# Patient Record
Sex: MALE | Race: WHITE | Employment: OTHER | URBAN - METROPOLITAN AREA
[De-identification: names, ages, dates, MRNs, and addresses within clinical notes are randomized per-mention and may not be internally consistent; named-entity substitution may affect disease eponyms.]

---

## 2020-01-29 ENCOUNTER — OFFICE VISIT (OUTPATIENT)
Dept: FAMILY MEDICINE | Facility: CLINIC | Age: 40
End: 2020-01-29
Payer: COMMERCIAL

## 2020-01-29 VITALS
HEIGHT: 69 IN | BODY MASS INDEX: 28.58 KG/M2 | HEART RATE: 80 BPM | SYSTOLIC BLOOD PRESSURE: 141 MMHG | WEIGHT: 193 LBS | DIASTOLIC BLOOD PRESSURE: 84 MMHG | TEMPERATURE: 98.1 F | OXYGEN SATURATION: 97 %

## 2020-01-29 DIAGNOSIS — B07.9 VIRAL WARTS, UNSPECIFIED TYPE: ICD-10-CM

## 2020-01-29 DIAGNOSIS — N52.9 ERECTILE DYSFUNCTION, UNSPECIFIED ERECTILE DYSFUNCTION TYPE: ICD-10-CM

## 2020-01-29 DIAGNOSIS — Z76.89 ENCOUNTER TO ESTABLISH CARE WITH NEW DOCTOR: Primary | ICD-10-CM

## 2020-01-29 DIAGNOSIS — I10 BENIGN ESSENTIAL HYPERTENSION: ICD-10-CM

## 2020-01-29 PROCEDURE — 99204 OFFICE O/P NEW MOD 45 MIN: CPT | Performed by: INTERNAL MEDICINE

## 2020-01-29 RX ORDER — TADALAFIL 5 MG/1
5 TABLET ORAL DAILY PRN
Qty: 30 TABLET | Refills: 11 | Status: SHIPPED | OUTPATIENT
Start: 2020-01-29 | End: 2021-04-22

## 2020-01-29 RX ORDER — TADALAFIL 5 MG/1
5 TABLET ORAL DAILY PRN
Qty: 30 TABLET | Refills: 11 | Status: SHIPPED | OUTPATIENT
Start: 2020-01-29 | End: 2020-01-29

## 2020-01-29 ASSESSMENT — MIFFLIN-ST. JEOR: SCORE: 1766.05

## 2020-01-29 NOTE — PROGRESS NOTES
Chief Complaint:     Raymundo Mcwilliams is a 39 year old male who presents to clinic today for the following health issues:    Establish care    Chief Complaint   Patient presents with     Erectile Dysfunction       HPI:   Patient Raymundo Mcwilliams is a very pleasant 39 year old male with history of wart of the right wrist, chronic ED symptoms who presents to Internal Medicine clinic today to establish care and for evaluation of multiple concerns including poorly controlled chronic ED symptoms. Regarding the patient's chronic ED, the patient complains of poorly controlled ED symptoms for the past a couple of years after he turned 38. he is not on any Ed medication at this time and is interested in starting an ED medication such as Cialis for ED treatment going forward. Regarding the patient's chronic right wrist wart symptoms, the patient is interested in an evaluation by the Skin care clinic at [facility] going forward. No chest pain, headaches, fever or chills.         Current Medications:     Current Outpatient Medications   Medication Sig Dispense Refill     order for DME Equipment being ordered: Digital home blood pressure monitor kit 1 each 3     tadalafil (CIALIS) 5 MG tablet Take 1 tablet (5 mg) by mouth daily as needed (ED) 30 tablet 11         Allergies:      Allergies   Allergen Reactions     No Known Allergies             Past Medical History:     Past Medical History:   Diagnosis Date     ED (erectile dysfunction)      HTN (hypertension)          Past Surgical History:   No past surgical history on file.      Family Medical History:     Family History   Problem Relation Age of Onset     Diverticulitis Mother      Diverticulitis Father          Social History:     Social History     Socioeconomic History     Marital status:      Spouse name: Not on file     Number of children: Not on file     Years of education: Not on file     Highest education level: Not on file   Occupational  History     Not on file   Social Needs     Financial resource strain: Not on file     Food insecurity:     Worry: Not on file     Inability: Not on file     Transportation needs:     Medical: Not on file     Non-medical: Not on file   Tobacco Use     Smoking status: Never Smoker     Smokeless tobacco: Never Used   Substance and Sexual Activity     Alcohol use: Yes     Comment: 10 drinks a week     Drug use: Not Currently     Sexual activity: Yes     Partners: Female   Lifestyle     Physical activity:     Days per week: Not on file     Minutes per session: Not on file     Stress: Not on file   Relationships     Social connections:     Talks on phone: Not on file     Gets together: Not on file     Attends Congregation service: Not on file     Active member of club or organization: Not on file     Attends meetings of clubs or organizations: Not on file     Relationship status: Not on file     Intimate partner violence:     Fear of current or ex partner: Not on file     Emotionally abused: Not on file     Physically abused: Not on file     Forced sexual activity: Not on file   Other Topics Concern     Not on file   Social History Narrative     Not on file           Review of System:     Constitutional: Negative for fever or chills  Skin: positive for wart symptoms on the dorsal right wrist  Ears/Nose/Throat: Negative for nasal congestion, sore throat  Respiratory: No shortness of breath, dyspnea on exertion, cough, or hemoptysis  Cardiovascular: Negative for chest pain  Gastrointestinal: Negative for nausea, vomiting  Genitourinary: Negative for dysuria, hematuria, positive for chronic ED  Musculoskeletal: Negative for myalgias  Neurologic: Negative for headaches  Psychiatric: Negative for depression, anxiety  Hematologic/Lymphatic/Immunologic: Negative  Endocrine: Negative  Behavioral: Negative for tobacco use       Physical Exam:   BP (!) 141/84 (BP Location: Left arm, Patient Position: Sitting, Cuff Size: Adult Large)   " Pulse 80   Temp 98.1  F (36.7  C) (Oral)   Ht 1.745 m (5' 8.7\")   Wt 87.5 kg (193 lb)   SpO2 97%   BMI 28.75 kg/m      GENERAL: alert and no distress  EYES: eyes grossly normal to inspection, and conjunctivae and sclerae normal  HENT: Normocephalic atraumatic. Nose and mouth without ulcers or lesions  NECK: supple  RESP: lungs clear to auscultation   CV: regular rate and rhythm, normal S1 S2  LYMPH: no peripheral edema   ABDOMEN: nondistended  MS: no gross musculoskeletal defects noted  SKIN:wart symptoms noted on the dorsal right wrist  NEURO: Alert & Oriented x 3.   PSYCH: mentation appears normal, affect normal        Diagnostic Test Results:     Diagnostic Test Results:  No results found for any visits on 01/29/20.    ASSESSMENT/PLAN:       (Z76.89) Encounter to establish care with new doctor  (primary encounter diagnosis)  (N52.9) Erectile dysfunction, unspecified erectile dysfunction type  Comment: poorly controlled chronic ED symptoms  Plan: tadalafil (CIALIS) 5 MG tablet    (B07.9) Viral warts, unspecified type  Comment: chronic wart symptoms of the right wrist  Plan: SKIN CARE REFERRAL      (I10) Benign essential hypertension  Comment: BP is mildly elevated. Patient wants to try diet and exercise weight loss treatment first before considering pharmaceutical medication therapy at this time  Plan: order for DME for outpatient BP monitor, patient is advised to start a new diet and exercise program going forward this year.      Follow Up Plan:     Patient is instructed to return to Internal Medicine clinic for follow-up visit in 1 month.        Sybil Cunningham MD  Internal Medicine  [facility]    "

## 2020-11-04 ENCOUNTER — OFFICE VISIT (OUTPATIENT)
Dept: DERMATOLOGY | Facility: CLINIC | Age: 40
End: 2020-11-04
Payer: COMMERCIAL

## 2020-11-04 DIAGNOSIS — L28.2 PRURIGO: Primary | ICD-10-CM

## 2020-11-04 PROCEDURE — 17110 DESTRUCTION B9 LES UP TO 14: CPT | Performed by: DERMATOLOGY

## 2020-11-04 PROCEDURE — 99202 OFFICE O/P NEW SF 15 MIN: CPT | Mod: 25 | Performed by: DERMATOLOGY

## 2020-11-04 RX ORDER — FLUOCINONIDE 0.5 MG/G
OINTMENT TOPICAL 2 TIMES DAILY
Qty: 30 G | Refills: 0 | Status: SHIPPED | OUTPATIENT
Start: 2020-11-04 | End: 2020-12-21

## 2020-11-04 ASSESSMENT — PAIN SCALES - GENERAL: PAINLEVEL: NO PAIN (0)

## 2020-11-04 NOTE — NURSING NOTE
Dermatology Rooming Note    Raymundo Mcwilliams's goals for this visit include:   Chief Complaint   Patient presents with     Derm Problem     Raymundo is here today for a skin lesion on his left wrist      Kevyn Church, MERRILL

## 2020-11-04 NOTE — PATIENT INSTRUCTIONS
"For spot on wrist, I think this is a little \"prurigo\" spot or an itch/scratch spot.  We will freeze today.  Can then start topical steroid Lidex ointment 1-2 times daily and try to keep covered.    Return in 6-8 weeks, sooner if concerns. Cancel if doing well.    Cryotherapy    What is it?    Use of a very cold liquid, such as liquid nitrogen, to freeze and destroy abnormal skin cells that need to be removed    What should I expect?    Tenderness and redness    A small blister that might grow and fill with dark purple blood. There may be crusting.    More than one treatment may be needed if the lesions do not go away.    How do I care for the treated area?    Gently wash the area with your hands when bathing.    Use a thin layer of Vaseline to help with healing. You may use a Band-Aid.     The area should heal within 7-10 days and may leave behind a pink or lighter color.     Do not use an antibiotic or Neosporin ointment.     You may take acetaminophen (Tylenol) for pain.     Call your Doctor if you have:    Severe pain    Signs of infection (warmth, redness, cloudy yellow drainage, and or a bad smell)    Questions or concerns    Who should I call with questions?       [facility]: [phone]       [facility]: [phone]       For urgent needs outside of business hours call the [facility] at [phone]        and ask for the dermatology resident on call    "

## 2020-11-04 NOTE — LETTER
11/4/2020       RE: Raymundo Mcwilliams  [address]  [place] [address]     Dear Colleague,    Thank you for referring your patient, Raymundo Mcwilliams, to the [facility] DERMATOLOGY CLINIC [place] at [facility]. Please see a copy of my visit note below.    [facility] Dermatology Note      Dermatology Problem List:  1. Prurigo, L wrist.  - Cryo 11/4/2020, Lidex ointment    CC:   Chief Complaint   Patient presents with     Derm Problem     Raymundo is here today for a skin lesion on his left wrist          Encounter Date: Nov 4, 2020    History of Present Illness:  Mr. Raymundo Mcwillimas is a 40 year old male who presents for evaluation of lesion on left wrist. Present for >1 year. He will frequently pick at it. Tried wart remover and had it frozen once previously but no improvement. It will wax/wane in size but generally not increasing in size. Does not bleed or hurt. No other painful, bleeding, non-healing, or otherwise symptomatic lesions. No personal history of skin cancer. An uncle had skin cancer but does not think it was melanoma. Otherwise feeling well, no additional skin concerns.     Past Medical History:   There is no problem list on file for this patient.    Past Medical History:   Diagnosis Date     ED (erectile dysfunction)      HTN (hypertension)      History reviewed. No pertinent surgical history.    Social History:  Patient reports that he has never smoked. He has never used smokeless tobacco. He reports current alcohol use. He reports previous drug use.    Family History:  Family History   Problem Relation Age of Onset     Diverticulitis Mother      Diverticulitis Father      Skin Cancer Father      Skin Cancer Paternal Uncle      Melanoma No family hx of        Medications:  Current Outpatient Medications   Medication Sig Dispense Refill     fluocinonide (LIDEX) 0.05 % external ointment Apply topically 2 times daily To spot on  wrist as needed, keep covered with duoderm or bandaid 30 g 0     order for DME Equipment being ordered: Digital home blood pressure monitor kit (Patient not taking: Reported on 11/4/2020) 1 each 3     tadalafil (CIALIS) 5 MG tablet Take 1 tablet (5 mg) by mouth daily as needed (ED) (Patient not taking: Reported on 11/4/2020) 30 tablet 11     Allergies   Allergen Reactions     No Known Allergies          Review of Systems:  -Constitutional: Otherwise feeling well today, in usual state of health.  -Skin: As above in HPI. No additional skin concerns.    Physical exam:  GEN: This is a well developed, well-nourished male in no acute distress, in a pleasant mood.    SKIN: Focused examination of the left wrist was performed.  -William skin type: II  -~4 mm tan to slightly pink dome-shaped papule on left wrist. Dermoscopy no abnormal vessels or shiny white structures or other concerning features.  -No other lesions of concern on areas examined.     Labs:  n/a    Impression/Plan:  1. Prurigo nodule, left wrist. Natural history and etiology discussed. Advised empiric management as below. Plan for follow-up in 6-8 weeks to ensure improvement and/or repeat cryo if needed. He will call sooner if issues. Okay to cancel if doing well.  - Cryotherapy procedure note: After verbal consent and discussion of risks and benefits including but no limited to dyspigmentation/scar, blister, and pain, 1 was(were) treated with 1-2mm freeze border for 2 cycles with liquid nitrogen. Post cryotherapy instructions were provided.  - Start Lidex ointment BID prn, keep covered at all times with either duoderm or band-aid.    Follow-up in 6-8 weeks, earlier for new or changing lesions.      Staff Involved:  Staff Only    Lisa Jimenez MD    Department of Dermatology  [facility] [place]: Phone: [phone], Fax: [phone]  11/8/2020

## 2020-11-08 NOTE — PROGRESS NOTES
[facility] Dermatology Note      Dermatology Problem List:  1. Prurigo, L wrist.  - Cryo 11/4/2020, Lidex ointment    CC:   Chief Complaint   Patient presents with     Derm Problem     Raymundo is here today for a skin lesion on his left wrist          Encounter Date: Nov 4, 2020    History of Present Illness:  Mr. Raymundo Mcwilliams is a 40 year old male who presents for evaluation of lesion on left wrist. Present for >1 year. He will frequently pick at it. Tried wart remover and had it frozen once previously but no improvement. It will wax/wane in size but generally not increasing in size. Does not bleed or hurt. No other painful, bleeding, non-healing, or otherwise symptomatic lesions. No personal history of skin cancer. An uncle had skin cancer but does not think it was melanoma. Otherwise feeling well, no additional skin concerns.     Past Medical History:   There is no problem list on file for this patient.    Past Medical History:   Diagnosis Date     ED (erectile dysfunction)      HTN (hypertension)      History reviewed. No pertinent surgical history.    Social History:  Patient reports that he has never smoked. He has never used smokeless tobacco. He reports current alcohol use. He reports previous drug use.    Family History:  Family History   Problem Relation Age of Onset     Diverticulitis Mother      Diverticulitis Father      Skin Cancer Father      Skin Cancer Paternal Uncle      Melanoma No family hx of        Medications:  Current Outpatient Medications   Medication Sig Dispense Refill     fluocinonide (LIDEX) 0.05 % external ointment Apply topically 2 times daily To spot on wrist as needed, keep covered with duoderm or bandaid 30 g 0     order for DME Equipment being ordered: Digital home blood pressure monitor kit (Patient not taking: Reported on 11/4/2020) 1 each 3     tadalafil (CIALIS) 5 MG tablet Take 1 tablet (5 mg) by mouth daily as needed (ED) (Patient not taking: Reported on  11/4/2020) 30 tablet 11     Allergies   Allergen Reactions     No Known Allergies          Review of Systems:  -Constitutional: Otherwise feeling well today, in usual state of health.  -Skin: As above in HPI. No additional skin concerns.    Physical exam:  GEN: This is a well developed, well-nourished male in no acute distress, in a pleasant mood.    SKIN: Focused examination of the left wrist was performed.  -William skin type: II  -~4 mm tan to slightly pink dome-shaped papule on left wrist. Dermoscopy no abnormal vessels or shiny white structures or other concerning features.  -No other lesions of concern on areas examined.     Labs:  n/a    Impression/Plan:  1. Prurigo nodule, left wrist. Natural history and etiology discussed. Advised empiric management as below. Plan for follow-up in 6-8 weeks to ensure improvement and/or repeat cryo if needed. He will call sooner if issues. Okay to cancel if doing well.  - Cryotherapy procedure note: After verbal consent and discussion of risks and benefits including but no limited to dyspigmentation/scar, blister, and pain, 1 was(were) treated with 1-2mm freeze border for 2 cycles with liquid nitrogen. Post cryotherapy instructions were provided.  - Start Lidex ointment BID prn, keep covered at all times with either duoderm or band-aid.    Follow-up in 6-8 weeks, earlier for new or changing lesions.      Staff Involved:  Staff Only    Lisa Jimenez MD    Department of Dermatology  [facility]: Phone: [phone], Fax: [phone]  11/8/2020

## 2020-11-14 DIAGNOSIS — L28.2 PRURIGO: ICD-10-CM

## 2020-11-14 RX ORDER — FLUOCINONIDE 0.5 MG/G
OINTMENT TOPICAL 2 TIMES DAILY
Qty: 30 G | Refills: 0 | Status: CANCELLED | OUTPATIENT
Start: 2020-11-14

## 2020-11-16 NOTE — TELEPHONE ENCOUNTER
" Centralized Medication Refill Team note:  fluocinonide (LIDEX) 0.05 % external ointment, fax requesting day supply/duration of use per insurance requirement.     Per Dr Jimenez note: \"Prurigo nodule, left wrist. Natural history and etiology discussed. Advised empiric management as below. Plan for follow-up in 6-8 weeks to ensure improvement and/or repeat cryo if needed. He will call sooner if issues. Okay to cancel if doing well....    AVS : For spot on wrist, I think this is a little \"prurigo\" spot or an itch/scratch spot.  We will freeze today.  Can then start topical steroid Lidex ointment 1-2 times daily and try to keep covered.     Return in 6-8 weeks, sooner if concerns. Cancel if doing well.\"  Contacted pharmacy and reviewed 60 day supply.  "

## 2020-12-21 ENCOUNTER — OFFICE VISIT (OUTPATIENT)
Dept: DERMATOLOGY | Facility: CLINIC | Age: 40
End: 2020-12-21
Payer: COMMERCIAL

## 2020-12-21 DIAGNOSIS — L28.2 PRURIGO: ICD-10-CM

## 2020-12-21 PROCEDURE — 17110 DESTRUCTION B9 LES UP TO 14: CPT | Performed by: PHYSICIAN ASSISTANT

## 2020-12-21 PROCEDURE — 99212 OFFICE O/P EST SF 10 MIN: CPT | Mod: 25 | Performed by: PHYSICIAN ASSISTANT

## 2020-12-21 RX ORDER — FLUOCINONIDE 0.5 MG/G
OINTMENT TOPICAL 2 TIMES DAILY
Qty: 30 G | Refills: 0 | Status: SHIPPED | OUTPATIENT
Start: 2020-12-21 | End: 2023-11-02

## 2020-12-21 ASSESSMENT — PAIN SCALES - GENERAL: PAINLEVEL: NO PAIN (0)

## 2020-12-21 NOTE — PROGRESS NOTES
[facility] Dermatology Note    Dermatology Problem List:  1. Prurigo, L wrist.  - Cryo 11/4/2020, 12/21/20, Lidex ointment    CC:   Chief Complaint   Patient presents with     Derm Problem     Prurigo nodule, left wrist - never started the Lidex.     Encounter Date: Dec 21, 2020    History of Present Illness:  Mr. Raymundo Mcwilliams is a 40 year old male who returns regarding a prurigo nodule on the L wrist. He had cryo to the site last visit and did not stat the topical prescribed. Today he reports    Past Medical History:   There is no problem list on file for this patient.    Past Medical History:   Diagnosis Date     ED (erectile dysfunction)      HTN (hypertension)      No past surgical history on file.    Social History:  Patient reports that he has never smoked. He has never used smokeless tobacco. He reports current alcohol use. He reports previous drug use. Two kids, lives in [place].    Family History:  No fhx melanoma  NMSC in parental uncle    Medications:  Current Outpatient Medications   Medication Sig Dispense Refill     fluocinonide (LIDEX) 0.05 % external ointment Apply topically 2 times daily To spot on wrist as needed, keep covered with duoderm or bandaid (Patient not taking: Reported on 12/21/2020) 30 g 0     order for DME Equipment being ordered: Digital home blood pressure monitor kit (Patient not taking: Reported on 11/4/2020) 1 each 3     tadalafil (CIALIS) 5 MG tablet Take 1 tablet (5 mg) by mouth daily as needed (ED) (Patient not taking: Reported on 11/4/2020) 30 tablet 11     Allergies   Allergen Reactions     No Known Allergies      Review of Systems:  -Constitutional: The patient denies fatigue, fevers, chills, unintended weight loss, and night sweats.  -HEENT: Patient denies nonhealing oral sores.  -Skin: As above in HPI. No additional skin concerns.    Physical exam:  Vitals: There were no vitals taken for this visit.  GEN: This is a well developed, well-nourished  male in no acute distress, in a pleasant mood.    SKIN: Focused examination of the L wrist and face was performed.  -William skin type: II  -4 mm tan to slightly pink dome-shaped papule on left wrist. Dermoscopy no abnormal vessels or shiny white structures or other concerning features.  -No other lesions of concern on areas examined.     Impression/Plan:  1. Prurigo nodule, left wrist. Natural history and etiology discussed. Advised empiric management as below.  - Cryotherapy procedure note: After verbal consent and discussion of risks and benefits including but no limited to dyspigmentation/scar, blister, and pain, 1 was(were) treated with 1-2mm freeze border for 2 cycles with liquid nitrogen. Post cryotherapy instructions were provided.  - Start Lidex ointment BID prn, keep covered at all times with either duoderm or band-aid.  - Avoid picking/scratching area     Follow-up prn, earlier for new or changing lesions.     CC Dr. Rodriguez on close of this encounter.  Follow-up prn for new or changing lesions.       Staff Involved:  Staff Only  All risks, benefits and alternatives were discussed with patient.  Patient is in agreement and understands the assessment and plan.  All questions were answered.    Sari Tse PA-C, MPAS  [facility] [place]: Phone: [phone], Fax: [phone]  [facility]: Phone: [phone],  Fax: [phone]

## 2020-12-21 NOTE — NURSING NOTE
Dermatology Rooming Note    Raymundo Mcwilliams's goals for this visit include:   Chief Complaint   Patient presents with     Derm Problem     Prurigo nodule, left wrist - never started the Lidex.     Patricia Anand, CMA

## 2020-12-21 NOTE — LETTER
12/21/2020       RE: Raymundo Mcwilliams  [address]  [place] [address]     Dear Colleague,    Thank you for referring your patient, Raymundo Mcwilliams, to the [facility] DERMATOLOGY CLINIC [place] at [facility]. Please see a copy of my visit note below.    [facility] Dermatology Note    Dermatology Problem List:  1. Prurigo, L wrist.  - Cryo 11/4/2020, 12/21/20, Lidex ointment    CC:   Chief Complaint   Patient presents with     Derm Problem     Prurigo nodule, left wrist - never started the Lidex.     Encounter Date: Dec 21, 2020    History of Present Illness:  Mr. Raymundo Mcwilliams is a 40 year old male who returns regarding a prurigo nodule on the L wrist. He had cryo to the site last visit and did not stat the topical prescribed. Today he reports    Past Medical History:   There is no problem list on file for this patient.    Past Medical History:   Diagnosis Date     ED (erectile dysfunction)      HTN (hypertension)      No past surgical history on file.    Social History:  Patient reports that he has never smoked. He has never used smokeless tobacco. He reports current alcohol use. He reports previous drug use. Two kids, lives in [place].    Family History:  No fhx melanoma  NMSC in parental uncle    Medications:  Current Outpatient Medications   Medication Sig Dispense Refill     fluocinonide (LIDEX) 0.05 % external ointment Apply topically 2 times daily To spot on wrist as needed, keep covered with duoderm or bandaid (Patient not taking: Reported on 12/21/2020) 30 g 0     order for DME Equipment being ordered: Digital home blood pressure monitor kit (Patient not taking: Reported on 11/4/2020) 1 each 3     tadalafil (CIALIS) 5 MG tablet Take 1 tablet (5 mg) by mouth daily as needed (ED) (Patient not taking: Reported on 11/4/2020) 30 tablet 11     Allergies   Allergen Reactions     No Known Allergies      Review of  Systems:  -Constitutional: The patient denies fatigue, fevers, chills, unintended weight loss, and night sweats.  -HEENT: Patient denies nonhealing oral sores.  -Skin: As above in HPI. No additional skin concerns.    Physical exam:  Vitals: There were no vitals taken for this visit.  GEN: This is a well developed, well-nourished male in no acute distress, in a pleasant mood.    SKIN: Focused examination of the L wrist and face was performed.  -William skin type: II  -4 mm tan to slightly pink dome-shaped papule on left wrist. Dermoscopy no abnormal vessels or shiny white structures or other concerning features.  -No other lesions of concern on areas examined.     Impression/Plan:  1. Prurigo nodule, left wrist. Natural history and etiology discussed. Advised empiric management as below.  - Cryotherapy procedure note: After verbal consent and discussion of risks and benefits including but no limited to dyspigmentation/scar, blister, and pain, 1 was(were) treated with 1-2mm freeze border for 2 cycles with liquid nitrogen. Post cryotherapy instructions were provided.  - Start Lidex ointment BID prn, keep covered at all times with either duoderm or band-aid.  - Avoid picking/scratching area     Follow-up prn, earlier for new or changing lesions.     CC Dr. Rodriguez on close of this encounter.  Follow-up prn for new or changing lesions.       Staff Involved:  Staff Only  All risks, benefits and alternatives were discussed with patient.  Patient is in agreement and understands the assessment and plan.  All questions were answered.    Sari Tse PA-C, MPAS  [facility] [place]: Phone: [phone], Fax: [phone]  [facility]: Phone: [phone],  Fax: [phone]

## 2021-03-07 ENCOUNTER — HEALTH MAINTENANCE LETTER (OUTPATIENT)
Age: 41
End: 2021-03-07

## 2021-04-22 DIAGNOSIS — N52.9 ERECTILE DYSFUNCTION, UNSPECIFIED ERECTILE DYSFUNCTION TYPE: ICD-10-CM

## 2021-04-22 RX ORDER — TADALAFIL 5 MG/1
TABLET ORAL
Qty: 30 TABLET | Refills: 0 | Status: SHIPPED | OUTPATIENT
Start: 2021-04-22 | End: 2021-04-28

## 2021-04-28 ENCOUNTER — ANCILLARY PROCEDURE (OUTPATIENT)
Dept: GENERAL RADIOLOGY | Facility: CLINIC | Age: 41
End: 2021-04-28
Attending: INTERNAL MEDICINE
Payer: COMMERCIAL

## 2021-04-28 ENCOUNTER — OFFICE VISIT (OUTPATIENT)
Dept: FAMILY MEDICINE | Facility: CLINIC | Age: 41
End: 2021-04-28
Payer: COMMERCIAL

## 2021-04-28 VITALS
OXYGEN SATURATION: 99 % | SYSTOLIC BLOOD PRESSURE: 125 MMHG | HEIGHT: 69 IN | DIASTOLIC BLOOD PRESSURE: 83 MMHG | HEART RATE: 71 BPM | WEIGHT: 196 LBS | TEMPERATURE: 96.9 F | BODY MASS INDEX: 29.03 KG/M2

## 2021-04-28 DIAGNOSIS — G89.29 CHRONIC PAIN OF LEFT KNEE: ICD-10-CM

## 2021-04-28 DIAGNOSIS — N52.9 ERECTILE DYSFUNCTION, UNSPECIFIED ERECTILE DYSFUNCTION TYPE: ICD-10-CM

## 2021-04-28 DIAGNOSIS — M25.562 CHRONIC PAIN OF LEFT KNEE: Primary | ICD-10-CM

## 2021-04-28 DIAGNOSIS — M67.432 GANGLION CYST OF WRIST, LEFT: ICD-10-CM

## 2021-04-28 DIAGNOSIS — M25.562 CHRONIC PAIN OF LEFT KNEE: ICD-10-CM

## 2021-04-28 DIAGNOSIS — G89.29 CHRONIC PAIN OF LEFT KNEE: Primary | ICD-10-CM

## 2021-04-28 PROCEDURE — 73110 X-RAY EXAM OF WRIST: CPT | Mod: LT | Performed by: RADIOLOGY

## 2021-04-28 PROCEDURE — 99214 OFFICE O/P EST MOD 30 MIN: CPT | Performed by: INTERNAL MEDICINE

## 2021-04-28 PROCEDURE — 73560 X-RAY EXAM OF KNEE 1 OR 2: CPT | Mod: 26 | Performed by: RADIOLOGY

## 2021-04-28 RX ORDER — TADALAFIL 5 MG/1
TABLET ORAL
Qty: 30 TABLET | Refills: 11 | Status: SHIPPED | OUTPATIENT
Start: 2021-04-28 | End: 2022-05-19

## 2021-04-28 ASSESSMENT — MIFFLIN-ST. JEOR: SCORE: 1769.66

## 2021-04-28 NOTE — PROGRESS NOTES
Kamron Fonseca is a 41 year old who presents for the following health issues     HPI     Chief Complaint:     Knee pains    HPI:   Knee Pain    Duration:     Since: chronic           Specific cause: sports injuries    Description:      Location of pain: left knee     Character of pain: dull     Pain radiation: none    Intensity: moderate    History:      Pain interferes with job: yes     History of similar pain problems: yes     Any previous MRI or X-rays: none     Therapies tried without relief: none    Alleviating factors:      Improved by: rest    Precipitating factors:    Worsened by: running    Accompanying Signs & Symptoms: none            Current Medications:     Current Outpatient Medications   Medication Sig Dispense Refill     fluocinonide (LIDEX) 0.05 % external ointment Apply topically 2 times daily To spot on wrist as needed, keep covered with duoderm or bandaid 30 g 0     order for DME Equipment being ordered: Digital home blood pressure monitor kit 1 each 3     tadalafil (CIALIS) 5 MG tablet TAKE ONE TABLET BY MOUTH DAILY AS NEEDED 30 tablet 11         Allergies:      Allergies   Allergen Reactions     No Known Allergies             Past Medical History:     Past Medical History:   Diagnosis Date     ED (erectile dysfunction)      HTN (hypertension)          Past Surgical History:   No past surgical history on file.      Family Medical History:     Family History   Problem Relation Age of Onset     Diverticulitis Mother      Diverticulitis Father      Skin Cancer Father      Skin Cancer Paternal Uncle      Melanoma No family hx of          Social History:     Social History     Socioeconomic History     Marital status:      Spouse name: Not on file     Number of children: Not on file     Years of education: Not on file     Highest education level: Not on file   Occupational History     Not on file   Social Needs     Financial resource strain: Not on file     Food insecurity     Worry: Not  on file     Inability: Not on file     Transportation needs     Medical: Not on file     Non-medical: Not on file   Tobacco Use     Smoking status: Never Smoker     Smokeless tobacco: Never Used   Substance and Sexual Activity     Alcohol use: Yes     Comment: 10 drinks a week     Drug use: Not Currently     Sexual activity: Yes     Partners: Female   Lifestyle     Physical activity     Days per week: Not on file     Minutes per session: Not on file     Stress: Not on file   Relationships     Social connections     Talks on phone: Not on file     Gets together: Not on file     Attends Congregational service: Not on file     Active member of club or organization: Not on file     Attends meetings of clubs or organizations: Not on file     Relationship status: Not on file     Intimate partner violence     Fear of current or ex partner: Not on file     Emotionally abused: Not on file     Physically abused: Not on file     Forced sexual activity: Not on file   Other Topics Concern     Parent/sibling w/ CABG, MI or angioplasty before 65F 55M? Not Asked   Social History Narrative     Not on file           Review of System:     Constitutional: Negative for fever or chills  Skin: Negative for rashes  Ears/Nose/Throat: Negative for nasal congestion, sore throat  Respiratory: No shortness of breath, dyspnea on exertion, cough, or hemoptysis  Cardiovascular: Negative for chest pain  Gastrointestinal: Negative for nausea, vomiting  Genitourinary: Negative for dysuria, hematuria, positive for chronic ED  Musculoskeletal: positive for left knee pains noted, left wrist ganglion cyst symptoms  Neurologic: Negative for headaches  Psychiatric: Negative for depression, anxiety  Hematologic/Lymphatic/Immunologic: Negative  Endocrine: Negative  Behavioral: Negative for tobacco use       Physical Exam:   BP (!) 137/94 (BP Location: Right arm, Patient Position: Sitting, Cuff Size: Adult Regular)   Pulse 82   Temp 96.9  F (36.1  C) (Temporal)   " Ht 1.745 m (5' 8.7\")   Wt 88.9 kg (196 lb)   SpO2 99%   BMI 29.20 kg/m      GENERAL: alert and no distress  EYES: eyes grossly normal to inspection, and conjunctivae and sclerae normal  HENT: Normocephalic atraumatic. Nose and mouth without ulcers or lesions  NECK: supple  RESP: lungs clear to auscultation   CV: regular rate and rhythm, normal S1 S2  LYMPH: no peripheral edema   ABDOMEN: nondistended  MS: left knee pains noted, left wrist ganglion cyst symptoms noted  SKIN: no suspicious lesions or rashes  NEURO: Alert & Oriented x 3.   PSYCH: mentation appears normal, affect normal        Diagnostic Test Results:     Diagnostic Test Results:  Labs reviewed in Epic    ASSESSMENT/PLAN:       (M25.562,  G89.29) Chronic pain of left knee  (primary encounter diagnosis)  Comment: chronic left knee pains  Plan:  Orthopedic & Spine          Referral, XR Knee Standing Left 2         Views      (N52.9) Erectile dysfunction, unspecified erectile dysfunction type  Comment: stable, patient is due for a refill of Cialis medication.  Plan: tadalafil (CIALIS) 5 MG tablet      (M67.432) Ganglion cyst of wrist, left  Comment: likely left wrist ganglion cyst symptoms  Plan: XR Wrist Left G/E 3 Views,  Orthopedic & Spine          Referral        Follow Up Plan:     Patient is instructed to return to Internal Medicine clinic for follow-up visit in 1 month.        Sybil Cunningham MD  Internal Medicine  [facility]    "

## 2021-10-10 ENCOUNTER — HEALTH MAINTENANCE LETTER (OUTPATIENT)
Age: 41
End: 2021-10-10

## 2021-12-09 ENCOUNTER — OFFICE VISIT (OUTPATIENT)
Dept: FAMILY MEDICINE | Facility: CLINIC | Age: 41
End: 2021-12-09
Payer: COMMERCIAL

## 2021-12-09 VITALS
TEMPERATURE: 97.7 F | RESPIRATION RATE: 16 BRPM | WEIGHT: 194 LBS | HEART RATE: 82 BPM | BODY MASS INDEX: 28.73 KG/M2 | SYSTOLIC BLOOD PRESSURE: 143 MMHG | OXYGEN SATURATION: 97 % | DIASTOLIC BLOOD PRESSURE: 95 MMHG | HEIGHT: 69 IN

## 2021-12-09 DIAGNOSIS — Z13.29 SCREENING FOR THYROID DISORDER: ICD-10-CM

## 2021-12-09 DIAGNOSIS — Z12.5 SCREENING FOR PROSTATE CANCER: ICD-10-CM

## 2021-12-09 DIAGNOSIS — Z00.00 ROUTINE HISTORY AND PHYSICAL EXAMINATION OF ADULT: ICD-10-CM

## 2021-12-09 DIAGNOSIS — Z23 HIGH PRIORITY FOR 2019-NCOV VACCINE: Primary | ICD-10-CM

## 2021-12-09 DIAGNOSIS — Z13.220 LIPID SCREENING: ICD-10-CM

## 2021-12-09 DIAGNOSIS — Z13.1 SCREENING FOR DIABETES MELLITUS: ICD-10-CM

## 2021-12-09 LAB
ANION GAP SERPL CALCULATED.3IONS-SCNC: 4 MMOL/L (ref 3–14)
BASOPHILS # BLD AUTO: 0.1 10E3/UL (ref 0–0.2)
BASOPHILS NFR BLD AUTO: 1 %
BUN SERPL-MCNC: 18 MG/DL (ref 7–30)
CALCIUM SERPL-MCNC: 9.5 MG/DL (ref 8.5–10.1)
CHLORIDE BLD-SCNC: 105 MMOL/L (ref 94–109)
CO2 SERPL-SCNC: 27 MMOL/L (ref 20–32)
CREAT SERPL-MCNC: 1.05 MG/DL (ref 0.66–1.25)
DEPRECATED CALCIDIOL+CALCIFEROL SERPL-MC: 27 UG/L (ref 20–75)
EOSINOPHIL # BLD AUTO: 0.2 10E3/UL (ref 0–0.7)
EOSINOPHIL NFR BLD AUTO: 3 %
ERYTHROCYTE [DISTWIDTH] IN BLOOD BY AUTOMATED COUNT: 12 % (ref 10–15)
GFR SERPL CREATININE-BSD FRML MDRD: 87 ML/MIN/1.73M2
GLUCOSE BLD-MCNC: 105 MG/DL (ref 70–99)
HBA1C MFR BLD: 5.3 % (ref 0–5.6)
HCT VFR BLD AUTO: 45.7 % (ref 40–53)
HGB BLD-MCNC: 15.9 G/DL (ref 13.3–17.7)
LYMPHOCYTES # BLD AUTO: 2.6 10E3/UL (ref 0.8–5.3)
LYMPHOCYTES NFR BLD AUTO: 43 %
MCH RBC QN AUTO: 32.5 PG (ref 26.5–33)
MCHC RBC AUTO-ENTMCNC: 34.8 G/DL (ref 31.5–36.5)
MCV RBC AUTO: 94 FL (ref 78–100)
MONOCYTES # BLD AUTO: 0.7 10E3/UL (ref 0–1.3)
MONOCYTES NFR BLD AUTO: 11 %
NEUTROPHILS # BLD AUTO: 2.6 10E3/UL (ref 1.6–8.3)
NEUTROPHILS NFR BLD AUTO: 43 %
PLATELET # BLD AUTO: 272 10E3/UL (ref 150–450)
POTASSIUM BLD-SCNC: 4.4 MMOL/L (ref 3.4–5.3)
PSA SERPL-MCNC: 1.22 UG/L (ref 0–4)
RBC # BLD AUTO: 4.89 10E6/UL (ref 4.4–5.9)
SODIUM SERPL-SCNC: 136 MMOL/L (ref 133–144)
TSH SERPL DL<=0.005 MIU/L-ACNC: 1.9 MU/L (ref 0.4–4)
WBC # BLD AUTO: 6.1 10E3/UL (ref 4–11)

## 2021-12-09 PROCEDURE — 99396 PREV VISIT EST AGE 40-64: CPT | Performed by: INTERNAL MEDICINE

## 2021-12-09 PROCEDURE — 82306 VITAMIN D 25 HYDROXY: CPT | Performed by: INTERNAL MEDICINE

## 2021-12-09 PROCEDURE — 80048 BASIC METABOLIC PNL TOTAL CA: CPT | Performed by: INTERNAL MEDICINE

## 2021-12-09 PROCEDURE — 83036 HEMOGLOBIN GLYCOSYLATED A1C: CPT | Performed by: INTERNAL MEDICINE

## 2021-12-09 PROCEDURE — 91306 COVID-19,PF,MODERNA (18+ YRS BOOSTER .25ML): CPT | Performed by: INTERNAL MEDICINE

## 2021-12-09 PROCEDURE — 36415 COLL VENOUS BLD VENIPUNCTURE: CPT | Performed by: INTERNAL MEDICINE

## 2021-12-09 PROCEDURE — 84443 ASSAY THYROID STIM HORMONE: CPT | Performed by: INTERNAL MEDICINE

## 2021-12-09 PROCEDURE — 0064A COVID-19,PF,MODERNA (18+ YRS BOOSTER .25ML): CPT | Performed by: INTERNAL MEDICINE

## 2021-12-09 PROCEDURE — G0103 PSA SCREENING: HCPCS | Performed by: INTERNAL MEDICINE

## 2021-12-09 PROCEDURE — 85025 COMPLETE CBC W/AUTO DIFF WBC: CPT | Performed by: INTERNAL MEDICINE

## 2021-12-09 RX ORDER — METHYLPREDNISOLONE 4 MG
TABLET, DOSE PACK ORAL
Qty: 21 TABLET | Refills: 1 | Status: SHIPPED | OUTPATIENT
Start: 2021-12-09 | End: 2023-02-23

## 2021-12-09 RX ORDER — METHYLPREDNISOLONE 4 MG
TABLET, DOSE PACK ORAL
Qty: 21 TABLET | Refills: 1 | Status: SHIPPED | OUTPATIENT
Start: 2021-12-09 | End: 2021-12-09

## 2021-12-09 ASSESSMENT — MIFFLIN-ST. JEOR: SCORE: 1760.59

## 2021-12-09 ASSESSMENT — PAIN SCALES - GENERAL: PAINLEVEL: MILD PAIN (2)

## 2021-12-09 NOTE — LETTER
December 9, 2021      Raymundo Mcwilliams  [address] [address]        Dear ,    We are writing to inform you of your test results.    Your labs are OK. Advise diet, exercise. No change in meds. Recheck labs in 1 year.       Resulted Orders   PSA, screen   Result Value Ref Range    Prostate Specific Antigen Screen 1.22 0.00 - 4.00 ug/L   TSH with free T4 reflex   Result Value Ref Range    TSH 1.90 0.40 - 4.00 mU/L   Vitamin D Deficiency   Result Value Ref Range    Vitamin D, Total (25-Hydroxy) 27 20 - 75 ug/L    Narrative    Season, race, dietary intake, and treatment affect the concentration of 25-hydroxy-Vitamin D. Values may decrease during winter months and increase during summer months. Values 20-29 ug/L may indicate Vitamin D insufficiency and values <20 ug/L may indicate Vitamin D deficiency.    Vitamin D determination is routinely performed by an immunoassay specific for 25 hydroxyvitamin D3.  If an individual is on vitamin D2(ergocalciferol) supplementation, please specify 25 OH vitamin D2 and D3 level determination by LCMSMS test VITD23.     Basic metabolic panel  (Ca, Cl, CO2, Creat, Gluc, K, Na, BUN)   Result Value Ref Range    Sodium 136 133 - 144 mmol/L    Potassium 4.4 3.4 - 5.3 mmol/L    Chloride 105 94 - 109 mmol/L    Carbon Dioxide (CO2) 27 20 - 32 mmol/L    Anion Gap 4 3 - 14 mmol/L    Urea Nitrogen 18 7 - 30 mg/dL    Creatinine 1.05 0.66 - 1.25 mg/dL    Calcium 9.5 8.5 - 10.1 mg/dL    Glucose 105 (H) 70 - 99 mg/dL    GFR Estimate 87 >60 mL/min/1.73m2      Comment:      As of July 11, 2021, eGFR is calculated by the CKD-EPI creatinine equation, without race adjustment. eGFR can be influenced by muscle mass, exercise, and diet. The reported eGFR is an estimation only and is only applicable if the renal function is stable.   Hemoglobin A1c   Result Value Ref Range    Hemoglobin A1C 5.3 0.0 - 5.6 %      Comment:      Normal <5.7%   Prediabetes 5.7-6.4%    Diabetes 6.5%  or higher     Note: Adopted from ADA consensus guidelines.   CBC with platelets and differential   Result Value Ref Range    WBC Count 6.1 4.0 - 11.0 10e3/uL    RBC Count 4.89 4.40 - 5.90 10e6/uL    Hemoglobin 15.9 13.3 - 17.7 g/dL    Hematocrit 45.7 40.0 - 53.0 %    MCV 94 78 - 100 fL    MCH 32.5 26.5 - 33.0 pg    MCHC 34.8 31.5 - 36.5 g/dL    RDW 12.0 10.0 - 15.0 %    Platelet Count 272 150 - 450 10e3/uL    % Neutrophils 43 %    % Lymphocytes 43 %    % Monocytes 11 %    % Eosinophils 3 %    % Basophils 1 %    Absolute Neutrophils 2.6 1.6 - 8.3 10e3/uL    Absolute Lymphocytes 2.6 0.8 - 5.3 10e3/uL    Absolute Monocytes 0.7 0.0 - 1.3 10e3/uL    Absolute Eosinophils 0.2 0.0 - 0.7 10e3/uL    Absolute Basophils 0.1 0.0 - 0.2 10e3/uL       If you have any questions or concerns, please call the clinic at the number listed above.       Sincerely,      Sybil Cunningham MD

## 2021-12-09 NOTE — PROGRESS NOTES
SUBJECTIVE:   CC: Raymundo Mcwilliams is an 41 year old male who presents for preventative health visit.     Patient has been advised of split billing requirements and indicates understanding: No  Healthy Habits:    Getting at least 3 servings of Calcium per day:  Yes    Bi-annual eye exam:  Yes    Dental care twice a year:  Yes    Sleep apnea or symptoms of sleep apnea:  None    Diet:  Regular (no restrictions)    Frequency of exercise:  4-5 days/week    Duration of exercise:  30-45 minutes    Taking medications regularly:  Yes    Barriers to taking medications:  None    Medication side effects:  None    PHQ-2 Total Score:    Additional concerns today:  No    Ability to successfully perform activities of daily living: Yes, no assistance needed  Home safety:  none identified   Hearing impairment: none      Today's PHQ-2 Score:   PHQ-2 ( 1999 Pfizer) 4/28/2021   Q1: Little interest or pleasure in doing things 0   Q2: Feeling down, depressed or hopeless 0   PHQ-2 Score 0   PHQ-2 Total Score (12-17 Years)- Positive if 3 or more points; Administer PHQ-A if positive 0       Abuse: Current or Past(Physical, Sexual or Emotional)- No  Do you feel safe in your environment? Yes    Have you ever done Advance Care Planning? (For example, a Health Directive, POLST, or a discussion with a medical provider or your loved ones about your wishes): No, advance care planning information given to patient to review.  Patient plans to discuss their wishes with loved ones or provider.      Social History     Tobacco Use     Smoking status: Never Smoker     Smokeless tobacco: Never Used   Substance Use Topics     Alcohol use: Yes     Comment: 10 drinks a week     If you drink alcohol do you typically have >3 drinks per day or >7 drinks per week? No    Last PSA: No results found for: PSA    Reviewed orders with patient. Reviewed health maintenance and updated orders accordingly - Yes  Labs reviewed in EPIC    Reviewed and updated as needed this  "visit by clinical staff                Reviewed and updated as needed this visit by Provider               Past Medical History:   Diagnosis Date     ED (erectile dysfunction)      HTN (hypertension)         Review of Systems  CONSTITUTIONAL: NEGATIVE for fever, chills, change in weight  INTEGUMENTARY/SKIN: NEGATIVE for worrisome rashes, moles or lesions  EYES: NEGATIVE for vision changes or irritation  ENT: NEGATIVE for ear, mouth and throat problems  RESP: NEGATIVE for significant cough or SOB  CV: NEGATIVE for chest pain, palpitations or peripheral edema  GI: NEGATIVE for nausea, abdominal pain, heartburn, or change in bowel habits   male: negative for dysuria, hematuria, decreased urinary stream, erectile dysfunction, urethral discharge  MUSCULOSKELETAL: NEGATIVE for significant arthralgias or myalgia  NEURO: NEGATIVE for weakness, dizziness or paresthesias  PSYCHIATRIC: NEGATIVE for changes in mood or affect    OBJECTIVE:   BP (!) 143/95 (BP Location: Left arm, Patient Position: Sitting, Cuff Size: Adult Regular)   Pulse 82   Temp 97.7  F (36.5  C) (Temporal)   Resp 16   Ht 1.745 m (5' 8.7\")   Wt 88 kg (194 lb)   SpO2 97%   BMI 28.90 kg/m      Physical Exam  GENERAL: healthy, alert and no distress  EYES: Eyes grossly normal to inspection, PERRL and conjunctivae and sclerae normal  HENT: ear canals and TM's normal, nose and mouth without ulcers or lesions  NECK: no adenopathy, no asymmetry, masses, or scars and thyroid normal to palpation  RESP: lungs clear to auscultation - no rales, rhonchi or wheezes  CV: regular rate and rhythm, normal S1 S2, no S3 or S4, no murmur, click or rub, no peripheral edema and peripheral pulses strong  ABDOMEN: soft, nontender, no hepatosplenomegaly, no masses and bowel sounds normal  MS: no gross musculoskeletal defects noted, no edema  SKIN: no suspicious lesions or rashes  NEURO: Normal strength and tone, mentation intact and speech normal  PSYCH: mentation appears " "normal, affect normal/bright    Diagnostic Test Results:  Labs reviewed in Epic    ASSESSMENT/PLAN:   (Z00.00) Routine history and physical examination of adult  (primary encounter diagnosis)  (Z13.220) Lipid screening  (Z13.1) Screening for diabetes mellitus  (Z12.5) Screening for prostate cancer  (Z13.29) Screening for thyroid disorder  Comment: yearly physical exam today  Plan: PSA, screen, CBC with platelets and         differential, TSH with free T4 reflex, Vitamin         D Deficiency, Basic metabolic panel  (Ca, Cl,         CO2, Creat, Gluc, K, Na, BUN)   Hemoglobin A1c      Patient has been advised of split billing requirements and indicates understanding: Yes  COUNSELING:   Reviewed preventive health counseling, as reflected in patient instructions  Special attention given to:        Regular exercise       Healthy diet/nutrition    Estimated body mass index is 29.2 kg/m  as calculated from the following:    Height as of 4/28/21: 1.745 m (5' 8.7\").    Weight as of 4/28/21: 88.9 kg (196 lb).     Weight management plan: Discussed healthy diet and exercise guidelines    He reports that he has never smoked. He has never used smokeless tobacco.      Counseling Resources:  ATP IV Guidelines  Pooled Cohorts Equation Calculator  FRAX Risk Assessment  ICSI Preventive Guidelines  Dietary Guidelines for Americans, 2010  USDA's MyPlate  ASA Prophylaxis  Lung CA Screening    Sybil Cunningham MD  [facility]  "

## 2022-05-19 DIAGNOSIS — N52.9 ERECTILE DYSFUNCTION, UNSPECIFIED ERECTILE DYSFUNCTION TYPE: ICD-10-CM

## 2022-05-19 RX ORDER — TADALAFIL 5 MG/1
TABLET ORAL
Qty: 30 TABLET | Refills: 6 | Status: SHIPPED | OUTPATIENT
Start: 2022-05-19 | End: 2023-02-23

## 2022-09-18 ENCOUNTER — HEALTH MAINTENANCE LETTER (OUTPATIENT)
Age: 42
End: 2022-09-18

## 2023-01-29 ENCOUNTER — HEALTH MAINTENANCE LETTER (OUTPATIENT)
Age: 43
End: 2023-01-29

## 2023-02-22 NOTE — PROGRESS NOTES
"Raymundo is a 42 year old who is being evaluated via a billable video visit.      How would you like to obtain your AVS? MyChart  If the video visit is dropped, the invitation should be resent by: Text to cell phone: [phone]  Will anyone else be joining your video visit? No  {If patient encounters technical issues they should call [phone] :202961}        {PROVIDER CHARTING PREFERENCE:902877}    Subjective   Raymundo is a 42 year old{ACCOMPANIED BY STATEMENT (Optional):598476}, presenting for the following health issues:  Gastrointestinal Problem      History of Present Illness       Reason for visit:  45 b-day, nose issue and visual fields question    He eats 0-1 servings of fruits and vegetables daily.He consumes 1 sweetened beverage(s) daily.He exercises with enough effort to increase his heart rate 20 to 29 minutes per day.  He exercises with enough effort to increase his heart rate 4 days per week.   He is taking medications regularly.       {SUPERLIST (Optional):078046}  {additonal problems for provider to add (Optional):184527}    Review of Systems   {ROS COMP (Optional):005659}      Objective           Vitals:  No vitals were obtained today due to virtual visit.    Physical Exam   {video visit exam brief selected:912374::\"GENERAL: Healthy, alert and no distress\",\"EYES: Eyes grossly normal to inspection.  No discharge or erythema, or obvious scleral/conjunctival abnormalities.\",\"RESP: No audible wheeze, cough, or visible cyanosis.  No visible retractions or increased work of breathing.  \",\"SKIN: Visible skin clear. No significant rash, abnormal pigmentation or lesions.\",\"NEURO: Cranial nerves grossly intact.  Mentation and speech appropriate for age.\",\"PSYCH: Mentation appears normal, affect normal/bright, judgement and insight intact, normal speech and appearance well-groomed.\"}    {Diagnostic Test Results (Optional):755310}    {AMBULATORY ATTESTATION (Optional):976241}        Video-Visit Details    Type of " "service:  Video Visit     Originating Location (pt. Location): {video visit patient location:158985::\"Home\"}  {PROVIDER LOCATION On-site should be selected for visits conducted from your clinic location or adjoining [facility] hospital, academic office, or other nearby [facility] building. Off-site should be selected for all other provider locations, including home:002203}  Distant Location (provider location):  {virtual location provider:823250}  Platform used for Video Visit: {Virtual Visit Platforms:684549::\"[employer]\"}    "

## 2023-02-23 ENCOUNTER — VIRTUAL VISIT (OUTPATIENT)
Dept: FAMILY MEDICINE | Facility: CLINIC | Age: 43
End: 2023-02-23
Payer: COMMERCIAL

## 2023-02-23 DIAGNOSIS — R09.81 NASAL CONGESTION: ICD-10-CM

## 2023-02-23 DIAGNOSIS — Z00.00 ROUTINE HISTORY AND PHYSICAL EXAMINATION OF ADULT: Primary | ICD-10-CM

## 2023-02-23 DIAGNOSIS — H40.002 GLAUCOMA SUSPECT, LEFT: ICD-10-CM

## 2023-02-23 DIAGNOSIS — N52.9 ERECTILE DYSFUNCTION, UNSPECIFIED ERECTILE DYSFUNCTION TYPE: ICD-10-CM

## 2023-02-23 DIAGNOSIS — Z12.11 SPECIAL SCREENING FOR MALIGNANT NEOPLASMS, COLON: ICD-10-CM

## 2023-02-23 PROCEDURE — 99396 PREV VISIT EST AGE 40-64: CPT | Mod: VID | Performed by: INTERNAL MEDICINE

## 2023-02-23 RX ORDER — TADALAFIL 5 MG/1
TABLET ORAL
Qty: 30 TABLET | Refills: 6 | Status: SHIPPED | OUTPATIENT
Start: 2023-02-23 | End: 2024-04-02

## 2023-02-23 NOTE — PROGRESS NOTES
Raymundo is a 42 year old who is being evaluated via a billable video visit.      How would you like to obtain your AVS? MyChart  If the video visit is dropped, the invitation should be resent by: Text to cell phone: [phone]  Will anyone else be joining your video visit? No      SUBJECTIVE:   CC: Raymundo is an 42 year old who presents for preventative health visit.   Patient has been advised of split billing requirements and indicates understanding: Yes  HPI  Ability to successfully perform activities of daily living: Yes, no assistance needed  Home safety:  none identified   Hearing impairment:  none    Today's PHQ-2 Score:   PHQ-2 ( 1999 Pfizer) 2/23/2023   Q1: Little interest or pleasure in doing things 0   Q2: Feeling down, depressed or hopeless 0   PHQ-2 Score 0   PHQ-2 Total Score (12-17 Years)- Positive if 3 or more points; Administer PHQ-A if positive -   Q1: Little interest or pleasure in doing things Not at all   Q2: Feeling down, depressed or hopeless Not at all   PHQ-2 Score 0           Social History     Tobacco Use     Smoking status: Never     Smokeless tobacco: Never   Substance Use Topics     Alcohol use: Yes     Comment: 10 drinks a week     If you drink alcohol do you typically have >3 drinks per day or >7 drinks per week? No    Alcohol Use 12/9/2021   Prescreen: >3 drinks/day or >7 drinks/week? No     Last PSA:   Prostate Specific Antigen Screen   Date Value Ref Range Status   12/09/2021 1.22 0.00 - 4.00 ug/L Final       Reviewed orders with patient. Reviewed health maintenance and updated orders accordingly - Yes  Labs reviewed in EPIC    Reviewed and updated as needed this visit by clinical staff   Tobacco  Allergies  Meds              Reviewed and updated as needed this visit by Provider                 Past Medical History:   Diagnosis Date     ED (erectile dysfunction)      HTN (hypertension)         Review of Systems  CONSTITUTIONAL: NEGATIVE for fever, chills, change in  weight  INTEGUMENTARY/SKIN: NEGATIVE for worrisome rashes, moles or lesions  EYES: POSITIVE for glaucoma  ENT: POSITIVE for chronic nasal congestion symptoms  RESP: NEGATIVE for significant cough or SOB  CV: NEGATIVE for chest pain, palpitations or peripheral edema  GI: NEGATIVE for nausea, abdominal pain, heartburn, or change in bowel habits   male: Positive for erectile dysfunction  MUSCULOSKELETAL: NEGATIVE for significant arthralgias or myalgia  NEURO: NEGATIVE for weakness, dizziness or paresthesias  PSYCHIATRIC: NEGATIVE for changes in mood or affect    OBJECTIVE:   There were no vitals taken for this visit.    Physical Exam  GENERAL: alert and no distress  EYES: Eyes grossly normal to inspection and conjunctivae and sclerae normal  HENT: nasal congestion symptoms present  NECK: no asymmetry  RESP: lno cough present  CV: patient appears to be hemodynamically stable  SKIN: no visible suspicious lesions or rashes  NEURO: mentation intact and speech normal  PSYCH: affect normal/bright    Diagnostic Test Results:  Labs reviewed in Epic    ASSESSMENT/PLAN:   Raymundo was seen today for gastrointestinal problem.    Diagnoses and all orders for this visit:    Routine history and physical examination of adult    Special screening for malignant neoplasms, colon  -     REVIEW OF HEALTH MAINTENANCE PROTOCOL ORDERS  -     Colonoscopy Screening  Referral; Future    Erectile dysfunction, unspecified erectile dysfunction type  -     tadalafil (CIALIS) 5 MG tablet; TAKE ONE TABLET BY MOUTH ONCE DAILY AS NEEDED    Nasal congestion  -     Adult ENT  Referral; Future    Glaucoma suspect, left  -     Adult Eye  Referral; Future        Patient has been advised of split billing requirements and indicates understanding: Yes      COUNSELING:   Reviewed preventive health counseling, as reflected in patient instructions  Special attention given to:        Regular exercise       Healthy diet/nutrition        He  reports that he has never smoked. He has never used smokeless tobacco.        Sybil Cunningham MD  [facility]        Video-Visit Details    Type of service:  Video Visit     Originating Location (pt. Location): Home    Distant Location (provider location):  Off-site  Platform used for Video Visit: [employer]

## 2023-03-16 ENCOUNTER — HOSPITAL ENCOUNTER (OUTPATIENT)
Facility: CLINIC | Age: 43
End: 2023-03-16
Attending: COLON & RECTAL SURGERY | Admitting: COLON & RECTAL SURGERY
Payer: COMMERCIAL

## 2023-03-16 ENCOUNTER — TELEPHONE (OUTPATIENT)
Dept: GASTROENTEROLOGY | Facility: CLINIC | Age: 43
End: 2023-03-16
Payer: COMMERCIAL

## 2023-03-16 NOTE — TELEPHONE ENCOUNTER
Screening Questions  BLUE  KIND OF PREP RED  LOCATION [review exclusion criteria] GREEN  SEDATION TYPE        Y Are you active on mychart?       Sybil Cunningham MD  Ordering/Referring Provider?        [facility] What type of coverage do you have?      N Have you had a positive covid test in the last 14 days?     30.2 1. BMI  [BMI 40+ - review exclusion criteria]    N  2. Are you able to give consent for your medical care? [IF NO,RN REVIEW]          N  3. Are you taking any prescription pain medications on a routine schedule   (ex narcotics: oxycodone, roxicodone, oxycontin,  and percocet)? [RN Review]          3a. EXTENDED PREP What kind of prescription?     N 4. Do you have any chemical dependencies such as alcohol, street drugs, or methadone?        **If yes 3- 5 , please schedule with MAC sedation.**          IF YES TO ANY 6 - 10 - HOSPITAL SETTING ONLY.     N 6.   Do you need assistance transferring?     N 7.   Have you had a heart or lung transplant?    N 8.   Are you currently on dialysis?   N 9.   Do you use daily home oxygen?   N 10. Do you take nitroglycerin?   10a.  If yes, how often?     11. [FEMALES]  NA Are you currently pregnant?    11a.  If yes, how many weeks? [ Greater than 12 weeks, OR NEEDED]    N 12. Do you have Pulmonary Hypertension? *NEED PAC APPT AT [facility] w/ MAC*     N 13. [review exclusion criteria]  Do you have any implantable devices in your body (pacemaker, defib, LVAD)?    N 14. In the past 6 months, have you had any heart related issues including cardiomyopathy or heart attack?     14a.  If yes, did it require cardiac stenting if so when?     N 15. Have you had a stroke or Transient ischemic attack (TIA - aka  mini stroke ) within 6 months?      N 16. Do you have mod to severe Obstructive Sleep Apnea?  [Hospital only]    N 17. Do you have SEVERE AND UNCONTROLLED asthma? *NEED PAC APPT AT [facility] w/MAC*     18. Are you currently taking any blood thinners?     18a. No. Continue to  "19.   18b. Yes/no Blood Thinner: No [CONTINUE TO #19]    N 19. Do you take the medication Phentermine?    19a. If yes, \"Hold for 7 days before procedure.  Please consult your prescribing provider if you have questions about holding this medication.\"     N  20. Do you have chronic kidney disease?      N  21. Do you have a diagnosis of diabetes?     N  22. On a regular basis do you go 3-5 days between bowel movements?      23. Preferred LOCAL Pharmacy for Pre Prescription    [ LIST ONLY ONE PHARMACY]        [id number] - [place] [address] AT [facility] OF [address]          - CLOSING REMINDERS -    Informed patient they will need an adult    Cannot take any type of public or medical transportation alone    Conscious Sedation- Needs  for 6 hours after the procedure       MAC/General-Needs  for 24 hours after procedure    Pre-Procedure Covid test to be completed [[facility] PCR Testing Required]    Confirmed Nurse will call to complete assessment       - SCHEDULING DETAILS -  N Hospital Setting Required? If yes, what is the exclusion?:    TORIE  Surgeon    05/03/2023  Date of Procedure  Lower Endoscopy [Colonoscopy]  [facility]   STANDARD GOLYTELY-If you answer yes to questions #8, #20, #21Which Colonoscopy Prep was Sent?     CS Sedation Type     N PAC / Pre-op Required               "

## 2023-04-10 DIAGNOSIS — H40.002 GLAUCOMA SUSPECT OF LEFT EYE: Primary | ICD-10-CM

## 2023-04-10 NOTE — ADDENDUM NOTE
After Visit Summary   2017    Eliana Bethea    MRN: [id number]           Patient Information     Date Of Birth          1940        Visit Information        Provider Department      2017 9:40 AM Moshe Corley MD [facility]        Today's Diagnoses     Lower abdominal pain    -  1       Follow-ups after your visit        Who to contact     Please call your clinic at [phone] to:    Ask questions about your health    Make or cancel appointments    Discuss your medicines    Learn about your test results    Speak to your doctor   If you have compliments or concerns about an experience at your clinic, or if you wish to file a complaint, please contact [facility] Patient Relations at [phone] or email us at [email or web address]         Additional Information About Your Visit        MyChart Information     DiJiPOP is an electronic gateway that provides easy, online access to your medical records. With DiJiPOP, you can request a clinic appointment, read your test results, renew a prescription or communicate with your care team.     To sign up for [employer] visit the website at [email or web address]   You will be asked to enter the access code listed below, as well as some personal information. Please follow the directions to create your username and password.     Your access code is: [email or web address]  Expires: 10/18/2017 10:26 AM     Your access code will  in 90 days. If you need help or a new code, please contact your [facility] or call [phone] for assistance.        Care EveryWhere ID     This is your Care EveryWhere ID. This could be used by other organizations to access your [place] medical records  [id number]        Your Vitals Were     Pulse Temperature BMI (Body Mass Index)             60 97.7  F (36.5  C) (Oral) 32.02 kg/m2          Blood Pressure from Last 3 Encounters:   17 139/73  Addended by: MIGUEL LUA on: 4/10/2023 09:00 AM     Modules accepted: Orders       08/02/17 114/67   07/20/17 118/65    Weight from Last 3 Encounters:   09/14/17 256 lb 3.2 oz (116.2 kg)   08/02/17 253 lb 2 oz (114.8 kg)   07/20/17 253 lb 6.4 oz (114.9 kg)              Today, you had the following     No orders found for display         Today's Medication Changes          These changes are accurate as of: 9/14/17 12:40 PM.  If you have any questions, ask your nurse or doctor.               These medicines have changed or have updated prescriptions.        Dose/Directions    * HYDROcodone-acetaminophen 5-325 MG per tablet   Commonly known as:  NORCO   This may have changed:  Another medication with the same name was added. Make sure you understand how and when to take each.   Used for:  Generalized abdominal pain   Changed by:  Cheikh Hayes MD        1 to 2 per day maximum 2tablet(s) per day   Quantity:  59 tablet   Refills:  0       * HYDROcodone-acetaminophen 5-325 MG per tablet   Commonly known as:  NORCO   This may have changed:  You were already taking a medication with the same name, and this prescription was added. Make sure you understand how and when to take each.   Used for:  Lower abdominal pain   Changed by:  Moshe Corley MD        One to two per day as needed for abdominal pain   Quantity:  60 tablet   Refills:  0       * Notice:  This list has 2 medication(s) that are the same as other medications prescribed for you. Read the directions carefully, and ask your doctor or other care provider to review them with you.         Where to get your medicines      Some of these will need a paper prescription and others can be bought over the counter.  Ask your nurse if you have questions.     Bring a paper prescription for each of these medications     HYDROcodone-acetaminophen 5-325 MG per tablet                Primary Care Provider Office Phone # Fax #    Moshe Corley [phone] [address] [facility] [id number]        Equal Access to Services     GAMALIEL EVANS AH:  Hadii aad ku hadalleyo Sodruali, waaxda luqadaha, qaybta kaalmada kirit, dale flashwillie andrews. So [facility] [phone].    ATENCIÓN: Si melly amezcua, tiene a flanagan disposición servicios gratuitos de asistencia lingüística. Llame al [phone].    We comply with applicable federal civil rights laws and [place] laws. We do not discriminate on the basis of race, color, national origin, age, disability sex, sexual orientation or gender identity.            Thank you!     Thank you for choosing [facility]  for your care. Our goal is always to provide you with excellent care. Hearing back from our patients is one way we can continue to improve our services. Please take a few minutes to complete the written survey that you may receive in the mail after your visit with us. Thank you!             Your Updated Medication List - Protect others around you: Learn how to safely use, store and throw away your medicines at www.disposemymeds.org.          This list is accurate as of: 9/14/17 12:40 PM.  Always use your most recent med list.                   Brand Name Dispense Instructions for use Diagnosis    acetaminophen 325 MG tablet    TYLENOL    60 tablet    650 mg bid as needed for  Pain NO MORE THAN 1300 mg per day    Abdominal pain, generalized       amLODIPine 2.5 MG tablet    NORVASC    60 tablet    One tab Twice a day    Essential hypertension with goal blood pressure less than 140/90       atorvastatin 20 MG tablet    LIPITOR    90 tablet    Take 1 tablet (20 mg) by mouth daily    Pure hypercholesterolemia       B-12 PO      Take 5,000 mcg by mouth daily        carvedilol 12.5 MG tablet    COREG    180 tablet    Take 1 tablet (12.5 mg) by mouth 2 times daily (with meals)    Chronic atrial fibrillation (H)       furosemide 40 MG tablet    LASIX    90 tablet    Take 2 tablets (80 mg) by mouth daily    Pedal edema       * HYDROcodone-acetaminophen 5-325 MG per tablet    NORCO    59 tablet    1 to  2 per day maximum 2tablet(s) per day    Generalized abdominal pain       * HYDROcodone-acetaminophen 5-325 MG per tablet    NORCO    60 tablet    One to two per day as needed for abdominal pain    Lower abdominal pain       indomethacin 75 MG CR capsule    INDOCIN SR    30 capsule    Take 1 capsule (75 mg) by mouth 2 times daily (with meals)    Acute idiopathic gout, unspecified site       linaclotide 145 MCG capsule    LINZESS    30 capsule    Take 1 capsule (145 mcg) by mouth every morning (before breakfast)    Slow transit constipation       lisinopril 40 MG tablet    PRINIVIL/ZESTRIL    30 tablet    Take 1 tablet (40 mg) by mouth daily Discontinue lisinopril 30 mg daily.  (dose increase)    Essential hypertension with goal blood pressure less than 140/90       MULTIVITAMIN ADULT PO           naproxen 375 MG tablet    NAPROSYN    30 tablet    As needed for gout    Acute idiopathic gout of right foot       omeprazole 20 MG CR capsule    priLOSEC    90 capsule    Take 1 capsule (20 mg) by mouth daily    Gastroesophageal reflux disease with esophagitis       rivaroxaban ANTICOAGULANT 20 MG Tabs tablet    XARELTO    30 tablet    Take 1 tablet (20 mg) by mouth daily (with dinner)    Chronic atrial fibrillation (H)       Simethicone 125 MG Caps     28 capsule    1 to 3 per day  Per gas pain    Diverticulosis of large intestine without hemorrhage       spironolactone 25 MG tablet    ALDACTONE    30 tablet    Take 1 tablet (25 mg) by mouth daily    Benign essential hypertension       * Notice:  This list has 2 medication(s) that are the same as other medications prescribed for you. Read the directions carefully, and ask your doctor or other care provider to review them with you.

## 2023-05-01 ENCOUNTER — HOSPITAL ENCOUNTER (OUTPATIENT)
Facility: AMBULATORY SURGERY CENTER | Age: 43
End: 2023-05-01
Attending: INTERNAL MEDICINE | Admitting: INTERNAL MEDICINE
Payer: COMMERCIAL

## 2023-05-01 ENCOUNTER — TELEPHONE (OUTPATIENT)
Dept: GASTROENTEROLOGY | Facility: CLINIC | Age: 43
End: 2023-05-01
Payer: COMMERCIAL

## 2023-05-01 NOTE — TELEPHONE ENCOUNTER
Caller: Raymundo Mcwilliams    Reason for Reschedule/Cancellation (please be detailed, any staff messages or encounters to note?): insurance will not cover hospital setting      Prior to reschedule please review:    Ordering Provider:HEATHER    Sedation per order:MODERATE    Does patient have any ASC Exclusions, please identify?: NO      Notes on Cancelled Procedure:    Procedure:Lower Endoscopy [Colonoscopy]     Date: 5/3    Location:[facility]; [address] [place] [address]    Surgeon: TORIE        Rescheduled: Yes    Procedure: Lower Endoscopy [Colonoscopy]     Date: 7/26    [facility]; [address] [facility], 5th Floor, [place] [address]    Surgeon: LEVENTHAL    Sedation Level Scheduled  CS,  Reason for Sedation Level PER ORDER    Prep/Instructions updated and sent: YES

## 2023-07-14 ENCOUNTER — TELEPHONE (OUTPATIENT)
Dept: GASTROENTEROLOGY | Facility: CLINIC | Age: 43
End: 2023-07-14
Payer: COMMERCIAL

## 2023-07-14 NOTE — TELEPHONE ENCOUNTER
Attempted to contact patient in order to complete pre assessment questions.     No answer. Left message to return call to [phone] option 4      Procedure details:    Patient scheduled for Colonoscopy  on 7/26/23.     Arrival time: 0645. Procedure time 0745    Pre op exam needed? N/A    [facility]; [address] [place] [address], 5[address] Floor, [place] [address]    Sedation type: Conscious sedation     Indication for procedure: screening colonoscopy      Chart review:     Electronic implanted devices? No    Diabetic? No    Medication review:    Anticoagulants? No    NSAIDS? No NSAID medications per patient's medication list.  RN will verify with pre-assessment call.    Other medication HOLDING recommendations:  N/A      Prep for procedure:     Bowel prep recommendation: Standard Golytely    Due to: Appt was originally scheduled in 3/2023, standard golytley instructions have been sent by scheduling. Patient could have miralax prep if wanting that instead. Does have constipation noted in chart, but from 2013.    Prep instructions sent via [employer].       Cassie Brown RN  Endoscopy Procedure Pre Assessment RN

## 2023-07-19 NOTE — TELEPHONE ENCOUNTER
Second call attempt to complete pre assessment.     No answer.  Left message to return call to [phone] #4 within 24 hours or risk procedure being cancelled.     Additional information needed?  N/A      Susan Patel RN  Endoscopy Procedure Pre Assessment RN

## 2023-07-21 ENCOUNTER — TELEPHONE (OUTPATIENT)
Dept: GASTROENTEROLOGY | Facility: CLINIC | Age: 43
End: 2023-07-21
Payer: COMMERCIAL

## 2023-07-21 ENCOUNTER — HOSPITAL ENCOUNTER (OUTPATIENT)
Facility: AMBULATORY SURGERY CENTER | Age: 43
End: 2023-07-21
Attending: INTERNAL MEDICINE | Admitting: INTERNAL MEDICINE
Payer: COMMERCIAL

## 2023-07-21 NOTE — TELEPHONE ENCOUNTER
No return call received.   Pre assessment was not completed for upcoming scheduled procedure.     Staff message sent to endoscopy scheduling to cancel procedure per policy.       Carmen Berger RN   Endoscopy Procedure Pre Assessment RN

## 2023-07-21 NOTE — TELEPHONE ENCOUNTER
Caller: No call made  Reason for Reschedule/Cancellation (please be detailed, any staff messages or encounters to note?): Pre-assessment call not completed.     Prior to reschedule please review:    Ordering Provider: Sybil Cunningham MD    Sedation per order: Moderate    Does patient have any ASC Exclusions, please identify?: No      Notes on Cancelled Procedure:    Procedure: Lower Endoscopy [Colonoscopy]     Date: 7/26/2023    Location: [facility]; [address] [place] [address], 5th Floor, [place] [address]    Surgeon: Leventhal      Rescheduled: No , sent MyChart.

## 2023-07-21 NOTE — TELEPHONE ENCOUNTER
Caller: Raymundo Mcwilliams    Reason for Reschedule/Cancellation (please be detailed, any staff messages or encounters to note?): rescheduled, had been cancelled due to incomplete pre-assessment      Prior to reschedule please review:    Ordering Provider: HEATHER    Sedation per order: CS    Does patient have any ASC Exclusions, please identify?: NO      Notes on Cancelled Procedure:    Procedure: Lower Endoscopy [Colonoscopy]     Date: 7/26    Location: [facility] [place]; [address] [place] [address], [address] [place] [address]    Surgeon: LEVENTHAL      Rescheduled: Yes    Procedure: Lower Endoscopy [Colonoscopy]     Date: 7/26    Location: [facility] [place]; [address] [place] [address], [address] [place] [address]    Surgeon: LEVENTHAL    Sedation Level Scheduled  CS,  Reason for Sedation Level PER ORDER    Prep/Instructions updated and sent: YES     Send In - basket message to Panc - Nahun Pool if EUS  procedure is canceled or rescheduled: [ N/A, YES or NO] N/A

## 2023-07-21 NOTE — TELEPHONE ENCOUNTER
Patient called back and spot was still available so he was put back on the schedule    Pre assessment completed for upcoming procedure.   (Please see previous telephone encounter notes for complete details)    Patient  returned call.       Procedure details:    Arrival time and facility location reviewed    Pre op exam needed? N/A    Designated  policy reviewed. Instructed to have someone stay 6 hours post procedure.     COVID policy reviewed.      Medication review:    Medications reviewed. Please see supporting documentation below. Holding recommendations discussed (if applicable).       Prep for procedure:     Reviewed procedure prep instructions.       Additional information needed?  N/A      Patient  verbalized understanding and had no questions or concerns at this time.      Carmen Wynn RN  Endoscopy Procedure Pre Assessment RN  [phone] option 4

## 2023-07-25 ENCOUNTER — TELEPHONE (OUTPATIENT)
Dept: GASTROENTEROLOGY | Facility: CLINIC | Age: 43
End: 2023-07-25
Payer: COMMERCIAL

## 2023-07-25 ENCOUNTER — HOSPITAL ENCOUNTER (OUTPATIENT)
Facility: AMBULATORY SURGERY CENTER | Age: 43
End: 2023-07-25
Attending: INTERNAL MEDICINE
Payer: COMMERCIAL

## 2023-07-25 NOTE — TELEPHONE ENCOUNTER
Caller: Raymundo Mcwilliams    Reason for Reschedule/Cancellation (please be detailed, any staff messages or encounters to note?): pt needs to reschedule due to poor prep      Prior to reschedule please review:  Ordering Provider: HEATHER  Sedation per order: CS  Does patient have any ASC Exclusions, please identify?: NO      Notes on Cancelled Procedure:  Procedure: Lower Endoscopy [Colonoscopy]   Date: 7/26  [facility]; [address] [facility], 5th Floor, [place] [address]  Surgeon: LEVENTHAL      Rescheduled: Yes  Procedure: Lower Endoscopy [Colonoscopy]   Date: 9/20/23  Location: [facility] [place]; [address], [place] [id number]  Surgeon: DEONNA  Sedation Level Scheduled  MODERATE,  Reason for Sedation Level PER ORDER  Prep/Instructions updated and sent: YES/JAYLENHART     Send In - basket message to Panc - Nahun Pool if EUS  procedure is canceled or rescheduled: [ N/A, YES or NO] N/A

## 2023-07-25 NOTE — TELEPHONE ENCOUNTER
Pt called as he was suppose to be on clear liquids today for his appt tomorrow. Pt ate a large chicken salad sandwich. Writer advised pt that he reschedule appt. Pt transferred to scheduling queue. Susan Patel RN

## 2023-08-11 ENCOUNTER — NURSE TRIAGE (OUTPATIENT)
Dept: FAMILY MEDICINE | Facility: CLINIC | Age: 43
End: 2023-08-11
Payer: COMMERCIAL

## 2023-08-11 ENCOUNTER — OFFICE VISIT (OUTPATIENT)
Dept: URGENT CARE | Facility: URGENT CARE | Age: 43
End: 2023-08-11
Payer: COMMERCIAL

## 2023-08-11 VITALS
SYSTOLIC BLOOD PRESSURE: 127 MMHG | TEMPERATURE: 97.8 F | DIASTOLIC BLOOD PRESSURE: 84 MMHG | WEIGHT: 189 LBS | HEART RATE: 68 BPM | BODY MASS INDEX: 28.15 KG/M2 | OXYGEN SATURATION: 99 %

## 2023-08-11 DIAGNOSIS — K21.00 GASTROESOPHAGEAL REFLUX DISEASE WITH ESOPHAGITIS WITHOUT HEMORRHAGE: Primary | ICD-10-CM

## 2023-08-11 PROCEDURE — 99213 OFFICE O/P EST LOW 20 MIN: CPT | Performed by: NURSE PRACTITIONER

## 2023-08-11 RX ORDER — FAMOTIDINE 40 MG/1
40 TABLET, FILM COATED ORAL DAILY
Qty: 30 TABLET | Refills: 0 | Status: SHIPPED | OUTPATIENT
Start: 2023-08-11

## 2023-08-11 NOTE — PROGRESS NOTES
Assessment & Plan     Gastroesophageal reflux disease with esophagitis without hemorrhage  - lidocaine (viscous) (XYLOCAINE) 2 % 15 mL, alum & mag hydroxide-simethicone (MAALOX) 15 mL GI Cocktail     Patient Instructions   Good relief with GI cocktail after 20 minutes  H pylori pending.    Start pepcid, if h pylori positive will add 2 antibiotics.    Push fluids.  Make sure you stay hydrated.    Follow up with PCP if this persists or worsens.      Return in about 1 week (around 8/18/2023) for with regular provider if symptoms persist.  Nydia Prakash RN, CNP    CS Urgent Care Provider  [facility] FLAKO Fonseca is a 43 year old male who presents to clinic today for the following health issues:  Chief Complaint   Patient presents with    Urgent Care     Possible parasites/food poison- acid reflux, metallic taste in mouth decreased, bloated, mild reflux x 1+ week believes it might be related to shrimp had diarrhea was prescribed z-pack      HPI    Abdominal Pain    Location: epigastric   Radiation: None.    Pain character: aching, burning, pressure, and cramping,   Severity: 4 on a scale of 1-10.    Duration: 10 day(s)   Course of Illness: fluctuating.  Exacerbated by: eating  Relieved by: nothing.  Associated Symptoms: anorexia, nausea, diarrhea, and bloating.  Surgical History: none  10 days ago maybe food poisoning?  Bad shrimp.    No recent travel.    No exotic foods.      Review of Systems  Constitutional, HEENT, cardiovascular, pulmonary, GI, , musculoskeletal, neuro, skin, endocrine and psych systems are negative, except as otherwise noted.      Objective    /84   Pulse 68   Temp 97.8  F (36.6  C) (Tympanic)   Wt 85.7 kg (189 lb)   SpO2 99%   BMI 28.15 kg/m    Physical Exam   GENERAL: healthy, alert and no distress  RESP: lungs clear to auscultation - no rales, rhonchi or wheezes  CV: regular rate and rhythm, normal S1 S2, no S3 or S4, no murmur, click or rub, no  Call my nurses at [phone] with any questions or if you think your symptoms are returning.  Bartholin’s Cyst (No Infection)  The Bartholin’s glands are very small glands found inside the labia (vaginal lips). The glands produce fluid to help keep the vagina moist. When the opening of a Bartholin’s gland becomes blocked, the gland may swell and form a cyst. The cyst may vary in size from small to large (1 to 3 cm in size). It may feel like a firm lump within the labia.  Treatment depends on the size of the cyst, whether it causes symptoms, and whether it’s infected. Small and/or uninfected cysts generally don’t require treatment. Large cysts and those that are painful or infected will likely need to be treated. In these cases, the cyst may need to be opened with an incision and then drained.  Home care  Your cyst does not need any treatment at this time. If you have some mild discomfort, you may be advised to take sitz baths. For this, you soak in a bath with a few inches of warm water, a few times a day, or as directed. This may help the cyst to rupture and drain in a few days. No restrictions on activities, such as sex, are needed.  Follow-up care  Follow up with your healthcare provider, or as advised.  When to seek medical advice  Call your healthcare provider right away if any of these occur:  · Fever of 100.4°F (38°C) or higher, or as directed by your provider  · Increased redness, pain, swelling, or foul-smelling drainage from the cyst or the area around it  · Cyst grows larger or causes symptoms that bother you  © 6976-6416 [employer] [address] [place] [address]. All rights reserved. This information is not intended as a substitute for professional medical care. Always follow your healthcare professional's instructions.         peripheral edema and peripheral pulses strong  ABDOMEN: tenderness epigastric, RUQ, and LUQ, bowel sounds normal, and bloated/full  MS: no gross musculoskeletal defects noted, no edema

## 2023-08-11 NOTE — TELEPHONE ENCOUNTER
Pt called     Was advised needing to see in person     [facility] did VV for possible food poisoning - they recommended stool testing, including H. Pylori testing     8/1/23 - ate shrimp, did virtual consult. Initially thought symtpoms were viral. Was having Diarrhea. Given Abx (zpak) did stop diarrhea for a while     Thought he was on mend initially but heartburn persists. No appetite. Eating but no desire to eat or drink alcohol      Loose stools again today     Has not done any testing for this     1 loose stool recently - was solid this week     Overall just not feeling well and it's persisting     Nothing available with clinic today or Monday. Pt needing to be seen before next week because he is traveling out of country. Agreed to come to [facility] today     Leanne LEDESMA, Triage RN  [facility]     Reason for Disposition   MILD diarrhea (e.g., 1-3 or more stools than normal in past 24 hours) diarrhea without known cause and present > 7 days    Additional Information   Negative: Shock suspected (e.g., cold/pale/clammy skin, too weak to stand, low BP, rapid pulse)   Negative: Difficult to awaken or acting confused (e.g., disoriented, slurred speech)   Negative: Sounds like a life-threatening emergency to the triager   Negative: Vomiting also present and worse than the diarrhea   Negative: Blood in stool and without diarrhea   Negative: SEVERE abdominal pain (e.g., excruciating) and present > 1 hour   Negative: SEVERE abdominal pain and age > 60 years   Negative: Bloody, black, or tarry bowel movements (Exception: chronic-unchanged black-grey bowel movements and is taking iron pills or Pepto-Bismol)   Negative: SEVERE diarrhea (e.g., 7 or more times / day more than normal) and age > 60 years   Negative: Constant abdominal pain lasting > 2 hours   Negative: Drinking very little and has signs of dehydration (e.g., no urine > 12 hours, very dry mouth, very lightheaded)    Negative: Patient sounds very sick or weak to the triager   Negative: SEVERE diarrhea (e.g., 7 or more times / day more than normal) and present > 24 hours (1 day)   Negative: MODERATE diarrhea (e.g., 4-6 times / day more than normal) and present > 48 hours (2 days)   Negative: MODERATE diarrhea (e.g., 4-6 times / day more than normal) and age > 70 years   Negative: Abdominal pain (Exception: Pain clears completely with each passage of diarrhea stool.)   Negative: Fever > 101 F (38.3 C)   Negative: Blood in the stool   Negative: Mucus or pus in stool has been present > 2 days and diarrhea is more than mild   Negative: Weak immune system (e.g., HIV positive, cancer chemo, splenectomy, organ transplant, chronic steroids)   Negative: Travel to a foreign country in past month   Negative: Recent antibiotic therapy (i.e., within last 2 months) and diarrhea present > 3 days since antibiotic was stopped   Negative: Recent hospitalization and diarrhea present > 3 days   Negative: Tube feedings (e.g., nasogastric, g-tube, j-tube)    Protocols used: Diarrhea-A-OH

## 2023-08-12 ENCOUNTER — APPOINTMENT (OUTPATIENT)
Dept: LAB | Facility: CLINIC | Age: 43
End: 2023-08-12
Payer: COMMERCIAL

## 2023-08-12 PROCEDURE — 87338 HPYLORI STOOL AG IA: CPT | Performed by: NURSE PRACTITIONER

## 2023-08-12 NOTE — PATIENT INSTRUCTIONS
Good relief with GI cocktail after 20 minutes  H pylori pending.    Start pepcid, if h pylori positive will add 2 antibiotics.    Push fluids.  Make sure you stay hydrated.    Follow up with PCP if this persists or worsens.

## 2023-08-14 ENCOUNTER — OFFICE VISIT (OUTPATIENT)
Dept: URGENT CARE | Facility: URGENT CARE | Age: 43
End: 2023-08-14
Payer: COMMERCIAL

## 2023-08-14 ENCOUNTER — MYC MEDICAL ADVICE (OUTPATIENT)
Dept: FAMILY MEDICINE | Facility: CLINIC | Age: 43
End: 2023-08-14

## 2023-08-14 ENCOUNTER — ANCILLARY PROCEDURE (OUTPATIENT)
Dept: GENERAL RADIOLOGY | Facility: CLINIC | Age: 43
End: 2023-08-14
Attending: PHYSICIAN ASSISTANT
Payer: COMMERCIAL

## 2023-08-14 ENCOUNTER — TELEPHONE (OUTPATIENT)
Dept: FAMILY MEDICINE | Facility: CLINIC | Age: 43
End: 2023-08-14
Payer: COMMERCIAL

## 2023-08-14 VITALS
TEMPERATURE: 97.2 F | HEART RATE: 76 BPM | BODY MASS INDEX: 28.44 KG/M2 | DIASTOLIC BLOOD PRESSURE: 84 MMHG | OXYGEN SATURATION: 98 % | WEIGHT: 190.89 LBS | SYSTOLIC BLOOD PRESSURE: 130 MMHG

## 2023-08-14 DIAGNOSIS — K52.9 GASTROENTERITIS: ICD-10-CM

## 2023-08-14 DIAGNOSIS — K21.00 GASTROESOPHAGEAL REFLUX DISEASE WITH ESOPHAGITIS WITHOUT HEMORRHAGE: Primary | ICD-10-CM

## 2023-08-14 DIAGNOSIS — R11.0 NAUSEA: ICD-10-CM

## 2023-08-14 LAB
ALBUMIN SERPL-MCNC: 4 G/DL (ref 3.4–5)
ALP SERPL-CCNC: 81 U/L (ref 40–150)
ALT SERPL W P-5'-P-CCNC: 39 U/L (ref 0–70)
AMYLASE SERPL-CCNC: 55 U/L (ref 28–100)
ANION GAP SERPL CALCULATED.3IONS-SCNC: 5 MMOL/L (ref 3–14)
AST SERPL W P-5'-P-CCNC: 35 U/L (ref 0–45)
BASOPHILS # BLD AUTO: 0.1 10E3/UL (ref 0–0.2)
BASOPHILS NFR BLD AUTO: 1 %
BILIRUB SERPL-MCNC: 0.8 MG/DL (ref 0.2–1.3)
BUN SERPL-MCNC: 13 MG/DL (ref 7–30)
CALCIUM SERPL-MCNC: 9.3 MG/DL (ref 8.5–10.1)
CHLORIDE BLD-SCNC: 107 MMOL/L (ref 94–109)
CO2 SERPL-SCNC: 28 MMOL/L (ref 20–32)
CREAT SERPL-MCNC: 1.1 MG/DL (ref 0.66–1.25)
EOSINOPHIL # BLD AUTO: 0.1 10E3/UL (ref 0–0.7)
EOSINOPHIL NFR BLD AUTO: 1 %
ERYTHROCYTE [DISTWIDTH] IN BLOOD BY AUTOMATED COUNT: 11.9 % (ref 10–15)
ERYTHROCYTE [SEDIMENTATION RATE] IN BLOOD BY WESTERGREN METHOD: 8 MM/HR (ref 0–15)
GFR SERPL CREATININE-BSD FRML MDRD: 84 ML/MIN/1.73M2
GLUCOSE BLD-MCNC: 88 MG/DL (ref 70–99)
H PYLORI AG STL QL IA: NEGATIVE
HCT VFR BLD AUTO: 43.9 % (ref 40–53)
HGB BLD-MCNC: 15 G/DL (ref 13.3–17.7)
IMM GRANULOCYTES # BLD: 0 10E3/UL
IMM GRANULOCYTES NFR BLD: 0 %
LIPASE SERPL-CCNC: 30 U/L (ref 13–60)
LYMPHOCYTES # BLD AUTO: 2.6 10E3/UL (ref 0.8–5.3)
LYMPHOCYTES NFR BLD AUTO: 29 %
MCH RBC QN AUTO: 30.8 PG (ref 26.5–33)
MCHC RBC AUTO-ENTMCNC: 34.2 G/DL (ref 31.5–36.5)
MCV RBC AUTO: 90 FL (ref 78–100)
MONOCYTES # BLD AUTO: 0.9 10E3/UL (ref 0–1.3)
MONOCYTES NFR BLD AUTO: 10 %
NEUTROPHILS # BLD AUTO: 5.3 10E3/UL (ref 1.6–8.3)
NEUTROPHILS NFR BLD AUTO: 59 %
PLATELET # BLD AUTO: 325 10E3/UL (ref 150–450)
POTASSIUM BLD-SCNC: 4.5 MMOL/L (ref 3.4–5.3)
PROT SERPL-MCNC: 7 G/DL (ref 6.8–8.8)
RBC # BLD AUTO: 4.87 10E6/UL (ref 4.4–5.9)
SODIUM SERPL-SCNC: 140 MMOL/L (ref 133–144)
WBC # BLD AUTO: 8.9 10E3/UL (ref 4–11)

## 2023-08-14 PROCEDURE — 85652 RBC SED RATE AUTOMATED: CPT | Performed by: PHYSICIAN ASSISTANT

## 2023-08-14 PROCEDURE — 74019 RADEX ABDOMEN 2 VIEWS: CPT | Mod: TC | Performed by: RADIOLOGY

## 2023-08-14 PROCEDURE — 82150 ASSAY OF AMYLASE: CPT | Performed by: PHYSICIAN ASSISTANT

## 2023-08-14 PROCEDURE — 99214 OFFICE O/P EST MOD 30 MIN: CPT | Performed by: PHYSICIAN ASSISTANT

## 2023-08-14 PROCEDURE — 83690 ASSAY OF LIPASE: CPT | Performed by: PHYSICIAN ASSISTANT

## 2023-08-14 PROCEDURE — 80053 COMPREHEN METABOLIC PANEL: CPT | Performed by: PHYSICIAN ASSISTANT

## 2023-08-14 PROCEDURE — 85025 COMPLETE CBC W/AUTO DIFF WBC: CPT | Performed by: PHYSICIAN ASSISTANT

## 2023-08-14 PROCEDURE — 36415 COLL VENOUS BLD VENIPUNCTURE: CPT | Performed by: PHYSICIAN ASSISTANT

## 2023-08-14 RX ORDER — ONDANSETRON 4 MG/1
4 TABLET, ORALLY DISINTEGRATING ORAL EVERY 8 HOURS PRN
Qty: 12 TABLET | Refills: 0 | Status: SHIPPED | OUTPATIENT
Start: 2023-08-14 | End: 2024-05-13

## 2023-08-14 RX ORDER — PANTOPRAZOLE SODIUM 40 MG/1
40 TABLET, DELAYED RELEASE ORAL DAILY
Qty: 30 TABLET | Refills: 0 | Status: SHIPPED | OUTPATIENT
Start: 2023-08-14

## 2023-08-14 RX ORDER — SUCRALFATE ORAL 1 G/10ML
1 SUSPENSION ORAL 4 TIMES DAILY
Qty: 414 ML | Refills: 0 | Status: SHIPPED | OUTPATIENT
Start: 2023-08-14

## 2023-08-14 NOTE — TELEPHONE ENCOUNTER
Patient calling back. Advised him that the lab is not completed at this time and that PCP would respond after he has seen the lab results. Patient in agreement but anxious to get results soon.     Paola Hanna RN on 8/14/2023 at 12:22 PM

## 2023-08-14 NOTE — TELEPHONE ENCOUNTER
See MyChart from patient needing provider review.   Please write back directly to pt or advise if clinic follow up needed.     Carole TERAN, RN  [facility] RN Triage Team

## 2023-08-14 NOTE — TELEPHONE ENCOUNTER
Test Results    Contacts         Type Contact Phone/Fax    08/14/2023 12:43 PM CDT Phone (Incoming) Raymundo Mcwilliams (Self) [phone] (H)            Who ordered the test:  n/a    Type of test: Lab    Date of test:  8/12/23    Where was the test performed:  [facility]    What are your questions/concerns?:      Would like to talk about test results since they are negative       Could we send this information to you in [facility] [employer] or would you prefer to receive a phone call?:   Patient would prefer a phone call   Okay to leave a detailed message?: Yes at Cell number on file:    Telephone Information:   Mobile [phone]

## 2023-08-14 NOTE — TELEPHONE ENCOUNTER
Pt called RN triage line asking next steps now that H. Pylori test was negative.     Pt states he is still having symptoms similar to what he was experiencing last Friday when he went into [facility]. He was prescribed Pepcid in [facility], but states he hasn't started it because has been taking zantac.    Writer scheduled with team at [place]. Pt states he will also head to [facility] today if he feels symptoms are bad enough. Writer asked pt to cancel appt tomorrow if he does go into [facility] today.    Appointments in Next Year      Aug 15, 2023  5:00 PM  (Arrive by 4:40 PM)  Provider Visit with Rose Lange PA-C  [facility] ([facility] [place] ) [phone]          KASSIDY GERBER RN on 8/14/2023 at 1:30 PM

## 2023-08-14 NOTE — PROGRESS NOTES
Assessment & Plan     Gastroesophageal reflux disease with esophagitis without hemorrhage    CBC normal  CMP normal  UA normal  Flat and upright Negative for acute findings, read by Bal Carroll PA-C [facility] at time of visit.    Gastroesophageal reflux disease (GERD) is the backward flow of stomach acid into the esophagus. The esophagus is the tube that leads from your throat to your stomach. A one-way valve prevents the stomach acid from backing up into this tube. But when you have GERD, this valve does not close tightly enough. This can also cause pain and swelling in your esophagus. (This is called esophagitis.)  If you have mild GERD symptoms including heartburn, you may be able to control the problem with antacids or over-the-counter medicine. You can also make lifestyle changes to help reduce your symptoms. These include changing your diet and eating habits, such as not eating late at night and losing weight.    - CBC with platelets and differential; Future  - Comprehensive metabolic panel (BMP + Alb, Alk Phos, ALT, AST, Total. Bili, TP); Future  - ESR: Erythrocyte sedimentation rate; Future  -- Lipase  - Amylase    Trial course :  - sucralfate (CARAFATE) 1 GM/10ML suspension; Take 10 mLs (1 g) by mouth 4 times daily  - pantoprazole (PROTONIX) 40 MG EC tablet; Take 1 tablet (40 mg) by mouth daily    Gastroenteritis    Xray abdomen Negative for acute findings, read by Bal Carroll PA-C [facility] at time of visit.    - XR Abdomen 2 Views; Future  - ondansetron (ZOFRAN ODT) 4 MG ODT tab; Take 1 tablet (4 mg) by mouth every 8 hours as needed for nausea    Nausea    The 'BRAT' diet is suggested, then progress to diet as tolerated as symptoms vianey. Call if bloody stools, persistent diarrhea, vomiting, fever or abdominal pain.  Fluids, rest  Zofran prn nausea  - ondansetron (ZOFRAN ODT) 4 MG ODT tab; Take 1 tablet (4 mg) by mouth every 8 hours as needed for nausea    Review of external notes as documented elsewhere in  note       At today's visit with Raymundo Mcwilliams , we discussed results, diagnosis, medications and formulated a plan.  We also discussed red flags for immediate return to clinic/ER, as well as indications for follow up with PCP if not improved in 3 days. Patient understood and agreed to plan. Raymundo Mcwilliams was discharged with stable vitals and has no further questions.       No follow-ups on file.    Bal Carroll, [facility], PA-IESHA  [facility]seca is a 43 year old, presenting for the following health issues:  Gastrophageal Reflux (Reports no appetite.) and Urgent Care (Reports being in UC last Fri. Negative H pylori. Reports being an active swimmer - took in a lot of water from pool.)      HPI   Review of Systems   Constitutional, HEENT, cardiovascular, pulmonary, GI, , musculoskeletal, neuro, skin, endocrine and psych systems are negative, except as otherwise noted.      Objective    /84   Pulse 76   Temp 97.2  F (36.2  C) (Tympanic)   Wt 86.6 kg (190 lb 14.2 oz)   SpO2 98%   BMI 28.44 kg/m    Body mass index is 28.44 kg/m .  Physical Exam   GENERAL: healthy, alert and no distress  EYES: Eyes grossly normal to inspection, PERRL and conjunctivae and sclerae normal  HENT: ear canals and TM's normal, nose and mouth without ulcers or lesions  NECK: no adenopathy, no asymmetry, masses, or scars and thyroid normal to palpation  RESP: lungs clear to auscultation - no rales, rhonchi or wheezes  CV: regular rate and rhythm, normal S1 S2, no S3 or S4, no murmur, click or rub, no peripheral edema and peripheral pulses strong  ABDOMEN: soft, nontender, no hepatosplenomegaly, no masses and bowel sounds normal  MS: no gross musculoskeletal defects noted, no edema  SKIN: no suspicious lesions or rashes  NEURO: Normal strength and tone, mentation intact and speech normal  PSYCH: mentation appears normal, affect normal/bright        Results for orders placed or performed in  visit on 08/14/23   XR Abdomen 2 Views     Status: None (Preliminary result)    Narrative    ABDOMEN TWO-THREE VIEW August 14, 2023 2:42 PM     HISTORY: Abdominal pain.    COMPARISON: None.    FINDINGS: Small amount of stool. No free air. There are no air filled  distended loops of small bowel. The colon is not distended. The lung  bases are unremarkable.      Impression    IMPRESSION: Nonobstructed bowel gas pattern.   Results for orders placed or performed in visit on 08/14/23   Comprehensive metabolic panel (BMP + Alb, Alk Phos, ALT, AST, Total. Bili, TP)     Status: Normal   Result Value Ref Range    Sodium 140 133 - 144 mmol/L    Potassium 4.5 3.4 - 5.3 mmol/L    Chloride 107 94 - 109 mmol/L    Carbon Dioxide (CO2) 28 20 - 32 mmol/L    Anion Gap 5 3 - 14 mmol/L    Urea Nitrogen 13 7 - 30 mg/dL    Creatinine 1.10 0.66 - 1.25 mg/dL    Calcium 9.3 8.5 - 10.1 mg/dL    Glucose 88 70 - 99 mg/dL    Alkaline Phosphatase 81 40 - 150 U/L    AST 35 0 - 45 U/L    ALT 39 0 - 70 U/L    Protein Total 7.0 6.8 - 8.8 g/dL    Albumin 4.0 3.4 - 5.0 g/dL    Bilirubin Total 0.8 0.2 - 1.3 mg/dL    GFR Estimate 84 >60 mL/min/1.73m2   ESR: Erythrocyte sedimentation rate     Status: Normal   Result Value Ref Range    Erythrocyte Sedimentation Rate 8 0 - 15 mm/hr   CBC with platelets and differential     Status: None   Result Value Ref Range    WBC Count 8.9 4.0 - 11.0 10e3/uL    RBC Count 4.87 4.40 - 5.90 10e6/uL    Hemoglobin 15.0 13.3 - 17.7 g/dL    Hematocrit 43.9 40.0 - 53.0 %    MCV 90 78 - 100 fL    MCH 30.8 26.5 - 33.0 pg    MCHC 34.2 31.5 - 36.5 g/dL    RDW 11.9 10.0 - 15.0 %    Platelet Count 325 150 - 450 10e3/uL    % Neutrophils 59 %    % Lymphocytes 29 %    % Monocytes 10 %    % Eosinophils 1 %    % Basophils 1 %    % Immature Granulocytes 0 %    Absolute Neutrophils 5.3 1.6 - 8.3 10e3/uL    Absolute Lymphocytes 2.6 0.8 - 5.3 10e3/uL    Absolute Monocytes 0.9 0.0 - 1.3 10e3/uL    Absolute Eosinophils 0.1 0.0 - 0.7 10e3/uL     Absolute Basophils 0.1 0.0 - 0.2 10e3/uL    Absolute Immature Granulocytes 0.0 <=0.4 10e3/uL   CBC with platelets and differential     Status: None    Narrative    The following orders were created for panel order CBC with platelets and differential.  Procedure                               Abnormality         Status                     ---------                               -----------         ------                     CBC with platelets and d...[[id number]]                      Final result                 Please view results for these tests on the individual orders.

## 2023-08-28 NOTE — TELEPHONE ENCOUNTER
Please call pt and let him know all his labs and H pylori are neg/normal.  He is going to need to have follow up with his PCP or GI for ongoing symptoms.  If he feels like he's worsening then going to the ED would be recommended.    Thank you,  LEI Muniz PA-C

## 2023-08-29 ENCOUNTER — NURSE TRIAGE (OUTPATIENT)
Dept: FAMILY MEDICINE | Facility: CLINIC | Age: 43
End: 2023-08-29
Payer: COMMERCIAL

## 2023-08-29 DIAGNOSIS — K52.9 CHRONIC DIARRHEA: Primary | ICD-10-CM

## 2023-08-29 NOTE — TELEPHONE ENCOUNTER
"Pt has been having GI symptoms x 1 month. He has had three appointments (VV, UC x2), antibiotics, imaging, labs completed. No diagnosis. Symptoms improved after antibitotics but have come back. Discussed immodium; doesn't sound like pt has tried an anti diarrhea med yet.   Pt requesting a follow up with PCP or referral to GI specialist.   negative for H. Pylori.     Last bowel movement: 8/29/2023 diarrhea x 2: pt denies dehydration.     Routing to provider; please advise: ok to schedule in same day 9/12023 at 230PM? Referral instead?     Can we leave a detailed message on this number? YES  Phone number patient can be reached at: Home number on file [phone] (home)    Starr Carlson RN  [facility] Triage    1. DIARRHEA SEVERITY: \"How bad is the diarrhea?\" \"How many more stools have you had in the past 24 hours than normal?\"     - NO DIARRHEA (SCALE 0)    - MILD (SCALE 1-3): Few loose or mushy BMs; increase of 1-3 stools over normal daily number of stools; mild increase in ostomy output.    -  MODERATE (SCALE 4-7): Increase of 4-6 stools daily over normal; moderate increase in ostomy output.  * SEVERE (SCALE 8-10; OR 'WORST POSSIBLE'): Increase of 7 or more stools daily over normal; moderate increase in ostomy output; incontinence.      Mild   2. ONSET: \"When did the diarrhea begin?\"       About a month ago   3. BM CONSISTENCY: \"How loose or watery is the diarrhea?\"       Loose   4. VOMITING: \"Are you also vomiting?\" If Yes, ask: \"How many times in the past 24 hours?\"       Denies   5. ABDOMINAL PAIN: \"Are you having any abdominal pain?\" If Yes, ask: \"What does it feel like?\" (e.g., crampy, dull, intermittent, constant)       Denies   6. ABDOMINAL PAIN SEVERITY: If present, ask: \"How bad is the pain?\"  (e.g., Scale 1-10; mild, moderate, or severe)    - MILD (1-3): doesn't interfere with normal activities, abdomen soft and not tender to touch     - MODERATE (4-7): interferes with normal activities or " "awakens from sleep, abdomen tender to touch     - SEVERE (8-10): excruciating pain, doubled over, unable to do any normal activities        Denies   7. ORAL INTAKE: If vomiting, \"Have you been able to drink liquids?\" \"How much liquids have you had in the past 24 hours?\"      Pt has been staying hydrated: pt has 8 cups of water a day; discussed Gatorade.   8. HYDRATION: \"Any signs of dehydration?\" (e.g., dry mouth [not just dry lips], too weak to stand, dizziness, new weight loss) \"When did you last urinate?\"      Denies   9. EXPOSURE: \"Have you traveled to a foreign country recently?\" \"Have you been exposed to anyone with diarrhea?\" \"Could you have eaten any food that was spoiled?\"      Yes; [place]. Stools were yellow. August 1st he ate something bad. August 2nd he had chills are really bad diarrhea. See VV   10. ANTIBIOTIC USE: \"Are you taking antibiotics now or have you taken antibiotics in the past 2 months?\"        Antibiotic for travelers diarrhea through [facility] appointment (pt unsure antbx name and not seen in chart.)   11. OTHER SYMPTOMS: \"Do you have any other symptoms?\" (e.g., fever, blood in stool)        Denies       Reason for Disposition   MILD diarrhea (e.g., 1-3 or more stools than normal in past 24 hours) diarrhea without known cause and present > 7 days    Additional Information   Negative: Shock suspected (e.g., cold/pale/clammy skin, too weak to stand, low BP, rapid pulse)   Negative: Difficult to awaken or acting confused (e.g., disoriented, slurred speech)   Negative: Sounds like a life-threatening emergency to the triager   Negative: Vomiting also present and worse than the diarrhea   Negative: Blood in stool and without diarrhea   Negative: SEVERE abdominal pain (e.g., excruciating) and present > 1 hour   Negative: SEVERE abdominal pain and age > 60 years   Negative: Bloody, black, or tarry bowel movements (Exception: chronic-unchanged black-grey bowel movements and is taking " iron pills or Pepto-Bismol)   Negative: SEVERE diarrhea (e.g., 7 or more times / day more than normal) and age > 60 years   Negative: Constant abdominal pain lasting > 2 hours   Negative: Drinking very little and has signs of dehydration (e.g., no urine > 12 hours, very dry mouth, very lightheaded)   Negative: Patient sounds very sick or weak to the triager   Negative: SEVERE diarrhea (e.g., 7 or more times / day more than normal) and present > 24 hours (1 day)   Negative: MODERATE diarrhea (e.g., 4-6 times / day more than normal) and present > 48 hours (2 days)   Negative: MODERATE diarrhea (e.g., 4-6 times / day more than normal) and age > 70 years   Negative: Abdominal pain (Exception: Pain clears completely with each passage of diarrhea stool.)   Negative: Fever > 101 F (38.3 C)   Negative: Blood in the stool   Negative: Mucus or pus in stool has been present > 2 days and diarrhea is more than mild   Negative: Weak immune system (e.g., HIV positive, cancer chemo, splenectomy, organ transplant, chronic steroids)   Negative: Travel to a foreign country in past month   Negative: Recent antibiotic therapy (i.e., within last 2 months) and diarrhea present > 3 days since antibiotic was stopped   Negative: Recent hospitalization and diarrhea present > 3 days   Negative: Tube feedings (e.g., nasogastric, g-tube, j-tube)    Protocols used: Diarrhea-A-OH

## 2023-09-01 NOTE — TELEPHONE ENCOUNTER
REFERRAL INFORMATION:  Referring Provider:  Dr. Sybil Cunningham MD  Referring Clinic:  [facility]  Reason for Visit/Diagnosis: Chronic diarrhea     FUTURE VISIT INFORMATION:  Appointment Date: 23  Appointment Time: 3 PM     NOTES STATUS DETAILS   OFFICE NOTE from Referring Provider Internal [facility]:  23; 23- Nurse triage with Sybil Cunningham MD   OFFICE NOTE from Other Specialist Internal; In process [facility]:  23- OV with Bal Carroll PA-C    [facility]:  23- OV with CHAITANYA [facility]:  Virtual visit for diarrhea?    HOSPITAL DISCHARGE SUMMARY/  ED VISITS N/A    OPERATIVE REPORT N/A    MEDICATION LIST Internal         ENDOSCOPY  N/A    COLONOSCOPY Internal  [facility]:  23- Colonoscopy   ERCP N/A    EUS N/A    STOOL TESTING Internal [place]:  23- H pylori stool test   PERTINENT LABS Internal    PATHOLOGY REPORTS (RELATED) N/A    IMAGING (CT, MRI, EGD, MRCP, Small Bowel Follow Through/SBT, MR/CT Enterography) Internal [facility]:  23- XR Abd     Records Requested    Facility  [facility]  Fax: [phone]   Outcome 23 9:00 AM- Faxed request to [facility] for medical records

## 2023-09-05 ENCOUNTER — VIRTUAL VISIT (OUTPATIENT)
Dept: GASTROENTEROLOGY | Facility: CLINIC | Age: 43
End: 2023-09-05
Attending: INTERNAL MEDICINE
Payer: COMMERCIAL

## 2023-09-05 ENCOUNTER — PRE VISIT (OUTPATIENT)
Dept: GASTROENTEROLOGY | Facility: CLINIC | Age: 43
End: 2023-09-05

## 2023-09-05 VITALS — WEIGHT: 188 LBS | BODY MASS INDEX: 28.01 KG/M2

## 2023-09-05 DIAGNOSIS — R11.0 NAUSEA: ICD-10-CM

## 2023-09-05 DIAGNOSIS — R19.5 CHANGE IN STOOL: Primary | ICD-10-CM

## 2023-09-05 PROCEDURE — 99205 OFFICE O/P NEW HI 60 MIN: CPT | Mod: VID | Performed by: PHYSICIAN ASSISTANT

## 2023-09-05 RX ORDER — ONDANSETRON 4 MG/1
4 TABLET, FILM COATED ORAL EVERY 8 HOURS PRN
Qty: 20 TABLET | Refills: 0 | Status: SHIPPED | OUTPATIENT
Start: 2023-09-05

## 2023-09-05 ASSESSMENT — PAIN SCALES - GENERAL: PAINLEVEL: SEVERE PAIN (6)

## 2023-09-05 NOTE — NURSING NOTE
Is the patient currently in the state of [place]? YES    Visit mode:VIDEO    If the visit is dropped, the patient can be reconnected by: VIDEO VISIT: Text to cell phone:   Telephone Information:   Mobile [phone]       Will anyone else be joining the visit? NO  (If patient encounters technical issues they should call [phone]    How would you like to obtain your AVS? MyChart    Are changes needed to the allergy or medication list? No    Reason for visit: Video Visit (Consult)    Cyndie CRAIG

## 2023-09-05 NOTE — PROGRESS NOTES
"  GI CLINIC VISIT    CC/REFERRING MD:  Sybil Cunningham  REASON FOR CONSULTATION: diarrhea     Chart Review  9/20 c-scope with Dr Steve     8/11 - UC visit for epigastric abd pain x 10d after eating bad shrimp.   burning pain associated w/ nausea, diarrhea and bloat   better with GI cocktail.   h.pylori stool antigen NEG  Rx'd pepcid and maalox solution   8/14 - CBC, CMP, lipase/amylase, ESR, KUB WNL     HPI  Raymundo Mcwilliams is a 43 year old year old male with no significant PMHx of  presenting to establish care with the [facility] GI group for diarrhea and changes in stooling.     Time line of events   8/2 - may have had some bad shrimp at restaurant   Night of 8/2-3 - start of sx - feverish, chills, \"flu like symptoms, nausea with emesis, fatigue. abd cramping   8/3-8/4 - above sx continued  8/5 - diarrhea 10x BM/d liquid stools w/ urgency and gas   Early Aug - tele visit, Rx'd zithromax x 3 days with resolution of diarrhea  8/11 - UC visit for diarrhea and tested NEG for h.pylori  8/14 - UC visit for follow-up - NEG KUB, unremarkable labs to inclue CBC, CMP, lipase, amylase, ESR. Given pepcid, protonix, maalox, carafate   Went on 8 day international trip w/ family to [facility] and [place]. No adventurous eating.   Labor day weekend [place] [employer] [place] Endorsed increased ETOH intake (8-9 drinks/d; normal is <2/d)  9/4 - diarrhea w/ fecal urgency again - 4 BM/d, first 2 liquid (BSC type 7), 2nd two softer, formed but not his normal (sounds like BSC type 4-5)   9/5 - OP GI appt     Overall feels his GI sx has not come back to baseline yet. Reports continued erratic stooling, lots of gas (belching), some changes in appetite (?slightly depressed appetite) with continued change in coloration and texture of stools. Every stool is mustard yellow and he reports possible oil droplets in toilet water. Feels that in general his stools are more soft in appearance than his normal. No blood or " melena.   Frequency of stooling has decreased and since onset, GI sx have improved significantly.     Normal BM - once but at times two times daily, BSC type 4, easy to pass w/o straining. BSC type 4. Good evacuation.Brown coloration w/o blood or melena.    Takes daily probiotic for years, feels it helps w/ belching / gas     In past 2-3 weeks, continues with intermittent nausea w/o emesis. Not daily occurrence. No known triggers.   He also endorses a crampy upper/mid abdominal pain that heralds need to have a BM - it then resolves with defecation. Not common to experience this sensation.     Have had some days of depressed appetite but when he eats, he finishes his meals. Weight is on the low end for him but this is not atypical as well (active, works out; normal is 190-195 lb). Most recently recorded weight at 188 lb     Endorses occ retrosternal burning, more frequent in past few weeks; once weekly or so. Prior to Aug, experienced it once monthly.     Meds tried - zofran, protonix, pepcid (pharmacy did not have in stock carfate) will take PRN which he feels helps his global GI sx   A few days ago took zantac but denies other OTC med used for above sx     He wonders if this is indicative of cancer or post-infectious IBS?     ROS - Denies current fevers, chills, ?weight loss, nausea, , dysphagia, odynophagia,regurgitation, hematochezia, melena.     FHX  PGM - passed from CRC , dx in early 70s  Dad - significant diverticular dz (2 colectomies). Denies CRC  P uncles - diverticular disease   M 1st degree cousin  - colon cancer (29 year old)   No other family history or GI related malignancy (esophageal, gastric, pancreatic, liver or colon) or family history of IBD/celiac disease.     Social Hx  Occ use of NSAIDs or Tylenol. (1x a mo of NSAID, ibuprofen)   No use of OTC herbal supplements/weight loss products.      ETOH - <2/d, this past weekend 8-9/d  Never tobacco products.   Occ THC gummy     PROBLEM LIST  Patient  Active Problem List    Diagnosis Date Noted     Constipation 04/15/2013     Priority: Medium     Elevated blood pressure reading without diagnosis of hypertension 11/10/2008     Priority: Medium     Heart murmur 11/10/2008     Priority: Medium     Formatting of this note might be different from the original.  Sounds innocent; will request old records.  Elia Jordan M.D. 11/10/2008 8:16 AM         PERTINENT PAST MEDICAL HISTORY:  Past Medical History:   Diagnosis Date     ED (erectile dysfunction)      HTN (hypertension)        PREVIOUS SURGERIES:  No past surgical history on file.    ALLERGIES:     Allergies   Allergen Reactions     No Known Allergies        PERTINENT MEDICATIONS:    Current Outpatient Medications:      famotidine (PEPCID) 40 MG tablet, Take 1 tablet (40 mg) by mouth daily, Disp: 30 tablet, Rfl: 0     fluocinonide (LIDEX) 0.05 % external ointment, Apply topically 2 times daily To spot on wrist as needed, keep covered with duoderm or bandaid, Disp: 30 g, Rfl: 0     ondansetron (ZOFRAN ODT) 4 MG ODT tab, Take 1 tablet (4 mg) by mouth every 8 hours as needed for nausea, Disp: 12 tablet, Rfl: 0     order for DME, Equipment being ordered: Digital home blood pressure monitor kit, Disp: 1 each, Rfl: 3     pantoprazole (PROTONIX) 40 MG EC tablet, Take 1 tablet (40 mg) by mouth daily, Disp: 30 tablet, Rfl: 0     sucralfate (CARAFATE) 1 GM/10ML suspension, Take 10 mLs (1 g) by mouth 4 times daily, Disp: 414 mL, Rfl: 0     tadalafil (CIALIS) 5 MG tablet, TAKE ONE TABLET BY MOUTH ONCE DAILY AS NEEDED, Disp: 30 tablet, Rfl: 6    SOCIAL HISTORY:  Social History     Socioeconomic History     Marital status:      Spouse name: Not on file     Number of children: Not on file     Years of education: Not on file     Highest education level: Not on file   Occupational History     Not on file   Tobacco Use     Smoking status: Never     Smokeless tobacco: Never   Substance and Sexual Activity     Alcohol use:  Yes     Comment: 10 drinks a week     Drug use: Not Currently     Sexual activity: Yes     Partners: Female   Other Topics Concern     Parent/sibling w/ CABG, MI or angioplasty before 65F 55M? Not Asked   Social History Narrative     Not on file     Social Determinants of Health     Financial Resource Strain: Not on file   Food Insecurity: Not on file   Transportation Needs: Not on file   Physical Activity: Not on file   Stress: Not on file   Social Connections: Not on file   Intimate Partner Violence: Not on file   Housing Stability: Not on file       FAMILY HISTORY:  Family History   Problem Relation Age of Onset     Diverticulitis Mother      Diverticulitis Father      Skin Cancer Father      Skin Cancer Paternal Uncle      Melanoma No family hx of        Past/family/social history reviewed and no changes    PHYSICAL EXAMINATION:  Vitals reviewed: There were no vitals taken for this visit.  Wt:   Wt Readings from Last 2 Encounters:   08/14/23 86.6 kg (190 lb 14.2 oz)   08/11/23 85.7 kg (189 lb)        Video physical exam  General: Patient appears well in no acute distress.   Skin: No visualized rash or lesions on visualized skin  Eyes: EOMI, no erythema, sclera icterus or discharge noted  Resp: Appears to be breathing comfortably without accessory muscle usage, speaking in full sentences, no cough  MSK: Appears to have normal range of motion based on visualized movements  Neurologic: No apparent tremors, facial movements symmetric  Psych: affect normal, alert and oriented    PERTINENT STUDIES:    Office Visit on 08/14/2023   Component Date Value Ref Range Status     Sodium 08/14/2023 140  133 - 144 mmol/L Final     Potassium 08/14/2023 4.5  3.4 - 5.3 mmol/L Final     Chloride 08/14/2023 107  94 - 109 mmol/L Final     Carbon Dioxide (CO2) 08/14/2023 28  20 - 32 mmol/L Final     Anion Gap 08/14/2023 5  3 - 14 mmol/L Final     Urea Nitrogen 08/14/2023 13  7 - 30 mg/dL Final     Creatinine 08/14/2023 1.10  0.66 -  1.25 mg/dL Final     Calcium 08/14/2023 9.3  8.5 - 10.1 mg/dL Final     Glucose 08/14/2023 88  70 - 99 mg/dL Final     Alkaline Phosphatase 08/14/2023 81  40 - 150 U/L Final     AST 08/14/2023 35  0 - 45 U/L Final     ALT 08/14/2023 39  0 - 70 U/L Final     Protein Total 08/14/2023 7.0  6.8 - 8.8 g/dL Final     Albumin 08/14/2023 4.0  3.4 - 5.0 g/dL Final     Bilirubin Total 08/14/2023 0.8  0.2 - 1.3 mg/dL Final     GFR Estimate 08/14/2023 84  >60 mL/min/1.73m2 Final     Erythrocyte Sedimentation Rate 08/14/2023 8  0 - 15 mm/hr Final     Lipase 08/14/2023 30  13 - 60 U/L Final     Amylase 08/14/2023 55  28 - 100 U/L Final     WBC Count 08/14/2023 8.9  4.0 - 11.0 10e3/uL Final     RBC Count 08/14/2023 4.87  4.40 - 5.90 10e6/uL Final     Hemoglobin 08/14/2023 15.0  13.3 - 17.7 g/dL Final     Hematocrit 08/14/2023 43.9  40.0 - 53.0 % Final     MCV 08/14/2023 90  78 - 100 fL Final     MCH 08/14/2023 30.8  26.5 - 33.0 pg Final     MCHC 08/14/2023 34.2  31.5 - 36.5 g/dL Final     RDW 08/14/2023 11.9  10.0 - 15.0 % Final     Platelet Count 08/14/2023 325  150 - 450 10e3/uL Final     % Neutrophils 08/14/2023 59  % Final     % Lymphocytes 08/14/2023 29  % Final     % Monocytes 08/14/2023 10  % Final     % Eosinophils 08/14/2023 1  % Final     % Basophils 08/14/2023 1  % Final     % Immature Granulocytes 08/14/2023 0  % Final     Absolute Neutrophils 08/14/2023 5.3  1.6 - 8.3 10e3/uL Final     Absolute Lymphocytes 08/14/2023 2.6  0.8 - 5.3 10e3/uL Final     Absolute Monocytes 08/14/2023 0.9  0.0 - 1.3 10e3/uL Final     Absolute Eosinophils 08/14/2023 0.1  0.0 - 0.7 10e3/uL Final     Absolute Basophils 08/14/2023 0.1  0.0 - 0.2 10e3/uL Final     Absolute Immature Granulocytes 08/14/2023 0.0  <=0.4 10e3/uL Final        Lab Results   Component Value Date    WBC 8.9 08/14/2023    WBC 6.1 12/09/2021    HGB 15.0 08/14/2023    HGB 15.9 12/09/2021     08/14/2023     12/09/2021    ALT 39 08/14/2023    AST 35 08/14/2023      08/14/2023     12/09/2021    BUN 13 08/14/2023    BUN 18 12/09/2021    CO2 28 08/14/2023    CO2 27 12/09/2021    TSH 1.90 12/09/2021        Liver Function Studies -   Recent Labs   Lab Test 08/14/23  1429   PROTTOTAL 7.0   ALBUMIN 4.0   BILITOTAL 0.8   ALKPHOS 81   AST 35   ALT 39        Narrative & Impression   ABDOMEN TWO-THREE VIEW August 14, 2023 2:42 PM      HISTORY: Abdominal pain.     COMPARISON: None.     FINDINGS: Small amount of stool. No free air. There are no air filled  distended loops of small bowel. The colon is not distended. The lung  bases are unremarkable.                                                                      IMPRESSION: Nonobstructed bowel gas pattern.         PREVIOUS ENDOSCOPY    No results found for this or any previous visit.    ASSESSMENT/PLAN:  Raymundo Mcwilliams is a 43 year old year old male with no significant PMHx  who presents to establish care with the  [facility] team for changes in stooling.On 8/2 he might have ate bad shrimp at a restaurant, then shortly thereafter he developed chills, fatigue, 10x watery diarrhea with fecal urgency and gas . Never had blood or black stools. He reached out to a virtual appt and was given zithromax x 3 days which stopped the diarrhea. His GI system however did not return back to baseline which brought him to the  8/11 where he was tested NEG for h.pylori. He then returned 8/14 where he had a normal KUB, neg CBC, CMP, lipase/amylase and ESR. He picked up 3/4 GI meds (protonix, zofran, pepcid) which he has used PRN and seems to have helped w/ diarrhea and overall GI sx (nausea, belch). He reports continued waxing and waning course of his GI sx which brings him to our visit today.     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  Consider hyperthyroidism.   -reviewed labs and XR from UC w/ patient  -check enteric panel (including giardia), c.diff, ova and parasite given continued GI sx  -given description of stooling (yellow with ?oil droplets) check fecal elastase and random fecal fat, though lower on my ddx  -check inflammatory marker fecal calpro and CRP. ESR was normal 8/14   -check TSH  -as he avoids gluten, or is on a very low gluten containing diet, we instituted shared decision making and decided against checking for celiac diease. Presentation is not typical for celiac and serology will likely be low yield given his current diet. In addition he plans to continue low gluten/GF diet.    -c-scope scheduled for 9/20 (normal screening) with Dr Steve. He will need to reschedule this, number given for endoscopy. Once it is rescheduled, if changes in stools continue, will consider sending message to endoscopist for random colon biopsy (though presentation is not typical for microscopic colitis). Will ask he send a [employer] message my way to alert me of new date   -OK to start fiber, per AVS  -encouraged to take bernie chews. Will send Rx zofran for severe nausea.   -rec to avoid dairy for now given ongoing GI changes   -suspect diarrhea from the other day(9/4) may be related to his alcohol use over labor day weekend festivities; encouraged him to decrease use to 2/d. Otherwise he reports daily etoh use is <2/d on a regular use.   Amylase/lipase WNL from 8/14. Presentation is not typical for acute pancreatitis but can consider in ddx      Future consideration  1. If C-scope and work up negative, consider IB Jacob trial  2. GI RD consult for low-fodmap diet with reintroduction     Orders Placed This Encounter   Procedures     Calprotectin Feces     Standing Status:   Future     Standing Expiration Date:   9/5/2024     CRP inflammation     Standing Status:   Future     Standing Expiration Date:   9/5/2024     Elastase Fecal     Standing  Status:   Future     Standing Expiration Date:   9/5/2024     Fat Stool Qual Random Collection     Standing Status:   Future     Standing Expiration Date:   10/5/2023     TSH with free T4 reflex     Standing Status:   Future     Standing Expiration Date:   9/5/2024     Enteric Bacteria and Virus Panel by ANITHA Stool     Standing Status:   Future     Standing Expiration Date:   9/5/2024     Order Specific Question:   Has patient been hospitalized for greater than 3 days?     Answer:   No     C. difficile Toxin B PCR with reflex to C. difficile Antigen and Toxins A/B EIA     Standing Status:   Future     Standing Expiration Date:   10/5/2023     Ova and Parasite Exam Routine     Standing Status:   Future     Standing Expiration Date:   9/5/2024     Order Specific Question:   Consider ordering the Enteric Pathogen and Virus Panel PCR ([id number]), unless one of the following apply (select one):     Answer:   Chronic Diarrhea (>2 weeks) with negative PCR ([id number])        Colorectal cancer screening: await c-scope results     RTC  - after endoscopy     Thank you for this consultation.  It was a pleasure to participate in the care of this patient; please contact me with any further questions.     Jayla Funes PA-C    Follow up: As planned above. Today, I personally spent 46 minutes in direct face to face time with the patient, of which greater than 50% of the time was spent in patient education and counseling as described above. Approximately 20 minutes were spent on indirect care associated with the patient's consultation including but not limited to review of: patient medical records to date, clinic visits, hospital records, lab results, imaging studies, procedural documentation, and coordinating care with other providers. The findings from this review are summarized in the above note. All of the above accounted for a cumulative time of 66 minutes and was performed on the date of service.

## 2023-09-05 NOTE — PATIENT INSTRUCTIONS
It was a pleasure taking care of you today.  I've included a brief summary of our discussion and care plan from today's visit below.  Please review this information with your primary care provider.  _______________________________________________________________________    My recommendations are summarized as follows:    Stool studies and labs for further evaluation - see below for number to schedule an appt   Agree with the colonoscopy scheduled for 9/20 - see below number to reschedule. When you call, please ask to schedule with GI endoscopy (so that I can better communicate with the doctor assigned to your case) . Send a [employer] message my way once it is scheduled so I am aware as well.   For now, try to avoid dairy which and further irritate the GI system as you're recovering  I am OK for you to take powder citrucel 1 tsp daily x 2 weeks, increase gradually by 1 tsp weekly. A good goal is 1 tablespoon a day.   Alcohol can worsen GI symptoms and cause diarrhea, especially as you're still recovering; would recommend to limit use to 2 servings a day  Agree with limiting anti-inflammatory meds- try to reach for tylenol first for pain relief. OK with you taking up to 3000 mg in a 24h period from all sources  I've sent zofran to the pharmacy for you - try to sparingly use this med as it can cause constipation (and we are still trying to normalize your bowel movements)  Can use bernie chews (GinGins) which have been helpful for nausea, bloat sensation   OK to try Gas-X to see if that helps with belching as well . It is over the counter. Take per package instructions.     Please call my nurse Beatrice at [phone] with any questions or concerns.    Return to GI Clinic in (after colonoscopy) months to review your progress.    _______________________________________________________________________    How do I schedule labs, imaging studies, or procedures that were ordered in clinic today?      Labs: To schedule lab  appointment at [facility], use my chart or call [phone]. If you have a [facility] lab closer to home where you are regularly seen you can give them a call.      Procedures: If a colonoscopy, upper endoscopy, breath test, esophageal manometry, or pH impedence was ordered today, our endoscopy team will call you to schedule this. If you have not heard from our endoscopy team within a week, please call [phone] option 2 to schedule.      Imaging Studies: If you were scheduled for a CT scan, X-ray, MRI, ultrasound, HIDA scan or other imaging study, please call [phone] to have this scheduled.      Referral: If a referral to another specialty was ordered, expect a phone call or follow instructions above. If you have not heard from anyone regarding your referral in a week, please call our clinic to check the status.      Who do I call with any questions after my visit?  Please be in touch if there are any further questions that arise following today's visit.  There are multiple ways to contact your gastroenterology care team.       During business hours, you may reach a Gastroenterology nurse at [phone]     To schedule or reschedule an appointment, please call [phone].      You can always send a secure message through [employer].  [employer] messages are answered by your nurse or doctor typically within 24 hours.  Please allow extra time on weekends and holidays.       For urgent/emergent questions after business hours, you may reach the on-call GI Fellow by contacting the [facility]  at [phone].     How will I get the results of any tests ordered?    You will receive all of your results.  If you have signed up for [employer], any tests ordered at your visit will be available to you after your physician reviews them.  Typically this takes 1-2 weeks.  If there are urgent results that require a change in your care plan, your physician or nurse will call you to discuss  the next steps.       What is [employer]?  [employer] is a secure way for you to access all of your healthcare records from the [facility].  It is a web based computer program, so you can sign on to it from any location.  It also allows you to send secure messages to your care team.  I recommend signing up for [employer] access if you have not already done so and are comfortable with using a computer.       How to I schedule a follow-up visit?  If you did not schedule a follow-up visit today, please call [phone] to schedule a follow-up office visit.      Sincerely,    Jayal Funes PA-C  Gastroenterology

## 2023-09-05 NOTE — LETTER
"    9/5/2023         RE: Raymundo Mcwilliams  [address] [address]        Dear Colleague,    Thank you for referring your patient, Raymundo Mcwilliams, to the [facility] [place]. Please see a copy of my visit note below.      GI CLINIC VISIT    CC/REFERRING MD:  Sybil Cunningham  REASON FOR CONSULTATION: diarrhea     Chart Review  9/20 c-scope with Dr Steve     8/11 - UC visit for epigastric abd pain x 10d after eating bad shrimp.   burning pain associated w/ nausea, diarrhea and bloat   better with GI cocktail.   h.pylori stool antigen NEG  Rx'd pepcid and maalox solution   8/14 - CBC, CMP, lipase/amylase, ESR, KUB WNL     HPI  Raymundo Mcwilliams is a 43 year old year old male with no significant PMHx of  presenting to establish care with the [facility] GI group for diarrhea and changes in stooling.     Time line of events   8/2 - may have had some bad shrimp at restaurant   Night of 8/2-3 - start of sx - feverish, chills, \"flu like symptoms, nausea with emesis, fatigue. abd cramping   8/3-8/4 - above sx continued  8/5 - diarrhea 10x BM/d liquid stools w/ urgency and gas   Early Aug - tele visit, Rx'd zithromax x 3 days with resolution of diarrhea  8/11 - UC visit for diarrhea and tested NEG for h.pylori  8/14 - UC visit for follow-up - NEG KUB, unremarkable labs to inclue CBC, CMP, lipase, amylase, ESR. Given pepcid, protonix, maalox, carafate   Went on 8 day international trip w/ family to [place] and [place]. No adventurous eating.   Labor day weekend -trip to [place] - he denies drinking it). Endorsed increased ETOH intake (8-9 drinks/d; normal is <2/d)  9/4 - diarrhea w/ fecal urgency again - 4 BM/d, first 2 liquid (BSC type 7), 2nd two softer, formed but not his normal (sounds like BSC type 4-5)   9/5 - OP GI appt     Overall feels his GI sx has not come back to baseline yet. Reports continued erratic stooling, lots of gas (belching), some changes in " appetite (?slightly depressed appetite) with continued change in coloration and texture of stools. Every stool is mustard yellow and he reports possible oil droplets in toilet water. Feels that in general his stools are more soft in appearance than his normal. No blood or melena.   Frequency of stooling has decreased and since onset, GI sx have improved significantly.     Normal BM - once but at times two times daily, BSC type 4, easy to pass w/o straining. BSC type 4. Good evacuation.Brown coloration w/o blood or melena.    Takes daily probiotic for years, feels it helps w/ belching / gas     In past 2-3 weeks, continues with intermittent nausea w/o emesis. Not daily occurrence. No known triggers.   He also endorses a crampy upper/mid abdominal pain that heralds need to have a BM - it then resolves with defecation. Not common to experience this sensation.     Have had some days of depressed appetite but when he eats, he finishes his meals. Weight is on the low end for him but this is not atypical as well (active, works out; normal is 190-195 lb). Most recently recorded weight at 188 lb     Endorses occ retrosternal burning, more frequent in past few weeks; once weekly or so. Prior to Aug, experienced it once monthly.     Meds tried - zofran, protonix, pepcid (pharmacy did not have in stock carfate) will take PRN which he feels helps his global GI sx   A few days ago took zantac but denies other OTC med used for above sx     He wonders if this is indicative of cancer or post-infectious IBS?     ROS - Denies current fevers, chills, ?weight loss, nausea, , dysphagia, odynophagia,regurgitation, hematochezia, melena.     FHX  PGM - passed from CRC , dx in early 70s  Dad - significant diverticular dz (2 colectomies). Denies CRC  P uncles - diverticular disease   M 1st degree cousin  - colon cancer (29 year old)   No other family history or GI related malignancy (esophageal, gastric, pancreatic, liver or colon) or family  history of IBD/celiac disease.     Social Hx  Occ use of NSAIDs or Tylenol. (1x a mo of NSAID, ibuprofen)   No use of OTC herbal supplements/weight loss products.      ETOH - <2/d, this past weekend 8-9/d  Never tobacco products.   Occ THC gummy     PROBLEM LIST  Patient Active Problem List    Diagnosis Date Noted    Constipation 04/15/2013     Priority: Medium    Elevated blood pressure reading without diagnosis of hypertension 11/10/2008     Priority: Medium    Heart murmur 11/10/2008     Priority: Medium     Formatting of this note might be different from the original.  Sounds innocent; will request old records.  Elia Jordan M.D. 11/10/2008 8:16 AM         PERTINENT PAST MEDICAL HISTORY:  Past Medical History:   Diagnosis Date    ED (erectile dysfunction)     HTN (hypertension)        PREVIOUS SURGERIES:  No past surgical history on file.    ALLERGIES:     Allergies   Allergen Reactions    No Known Allergies        PERTINENT MEDICATIONS:    Current Outpatient Medications:     famotidine (PEPCID) 40 MG tablet, Take 1 tablet (40 mg) by mouth daily, Disp: 30 tablet, Rfl: 0    fluocinonide (LIDEX) 0.05 % external ointment, Apply topically 2 times daily To spot on wrist as needed, keep covered with duoderm or bandaid, Disp: 30 g, Rfl: 0    ondansetron (ZOFRAN ODT) 4 MG ODT tab, Take 1 tablet (4 mg) by mouth every 8 hours as needed for nausea, Disp: 12 tablet, Rfl: 0    order for DME, Equipment being ordered: Digital home blood pressure monitor kit, Disp: 1 each, Rfl: 3    pantoprazole (PROTONIX) 40 MG EC tablet, Take 1 tablet (40 mg) by mouth daily, Disp: 30 tablet, Rfl: 0    sucralfate (CARAFATE) 1 GM/10ML suspension, Take 10 mLs (1 g) by mouth 4 times daily, Disp: 414 mL, Rfl: 0    tadalafil (CIALIS) 5 MG tablet, TAKE ONE TABLET BY MOUTH ONCE DAILY AS NEEDED, Disp: 30 tablet, Rfl: 6    SOCIAL HISTORY:  Social History     Socioeconomic History    Marital status:      Spouse name: Not on file    Number  of children: Not on file    Years of education: Not on file    Highest education level: Not on file   Occupational History    Not on file   Tobacco Use    Smoking status: Never    Smokeless tobacco: Never   Substance and Sexual Activity    Alcohol use: Yes     Comment: 10 drinks a week    Drug use: Not Currently    Sexual activity: Yes     Partners: Female   Other Topics Concern    Parent/sibling w/ CABG, MI or angioplasty before 65F 55M? Not Asked   Social History Narrative    Not on file     Social Determinants of Health     Financial Resource Strain: Not on file   Food Insecurity: Not on file   Transportation Needs: Not on file   Physical Activity: Not on file   Stress: Not on file   Social Connections: Not on file   Intimate Partner Violence: Not on file   Housing Stability: Not on file       FAMILY HISTORY:  Family History   Problem Relation Age of Onset    Diverticulitis Mother     Diverticulitis Father     Skin Cancer Father     Skin Cancer Paternal Uncle     Melanoma No family hx of        Past/family/social history reviewed and no changes    PHYSICAL EXAMINATION:  Vitals reviewed: There were no vitals taken for this visit.  Wt:   Wt Readings from Last 2 Encounters:   08/14/23 86.6 kg (190 lb 14.2 oz)   08/11/23 85.7 kg (189 lb)        Video physical exam  General: Patient appears well in no acute distress.   Skin: No visualized rash or lesions on visualized skin  Eyes: EOMI, no erythema, sclera icterus or discharge noted  Resp: Appears to be breathing comfortably without accessory muscle usage, speaking in full sentences, no cough  MSK: Appears to have normal range of motion based on visualized movements  Neurologic: No apparent tremors, facial movements symmetric  Psych: affect normal, alert and oriented    PERTINENT STUDIES:    Office Visit on 08/14/2023   Component Date Value Ref Range Status    Sodium 08/14/2023 140  133 - 144 mmol/L Final    Potassium 08/14/2023 4.5  3.4 - 5.3 mmol/L Final    Chloride  08/14/2023 107  94 - 109 mmol/L Final    Carbon Dioxide (CO2) 08/14/2023 28  20 - 32 mmol/L Final    Anion Gap 08/14/2023 5  3 - 14 mmol/L Final    Urea Nitrogen 08/14/2023 13  7 - 30 mg/dL Final    Creatinine 08/14/2023 1.10  0.66 - 1.25 mg/dL Final    Calcium 08/14/2023 9.3  8.5 - 10.1 mg/dL Final    Glucose 08/14/2023 88  70 - 99 mg/dL Final    Alkaline Phosphatase 08/14/2023 81  40 - 150 U/L Final    AST 08/14/2023 35  0 - 45 U/L Final    ALT 08/14/2023 39  0 - 70 U/L Final    Protein Total 08/14/2023 7.0  6.8 - 8.8 g/dL Final    Albumin 08/14/2023 4.0  3.4 - 5.0 g/dL Final    Bilirubin Total 08/14/2023 0.8  0.2 - 1.3 mg/dL Final    GFR Estimate 08/14/2023 84  >60 mL/min/1.73m2 Final    Erythrocyte Sedimentation Rate 08/14/2023 8  0 - 15 mm/hr Final    Lipase 08/14/2023 30  13 - 60 U/L Final    Amylase 08/14/2023 55  28 - 100 U/L Final    WBC Count 08/14/2023 8.9  4.0 - 11.0 10e3/uL Final    RBC Count 08/14/2023 4.87  4.40 - 5.90 10e6/uL Final    Hemoglobin 08/14/2023 15.0  13.3 - 17.7 g/dL Final    Hematocrit 08/14/2023 43.9  40.0 - 53.0 % Final    MCV 08/14/2023 90  78 - 100 fL Final    MCH 08/14/2023 30.8  26.5 - 33.0 pg Final    MCHC 08/14/2023 34.2  31.5 - 36.5 g/dL Final    RDW 08/14/2023 11.9  10.0 - 15.0 % Final    Platelet Count 08/14/2023 325  150 - 450 10e3/uL Final    % Neutrophils 08/14/2023 59  % Final    % Lymphocytes 08/14/2023 29  % Final    % Monocytes 08/14/2023 10  % Final    % Eosinophils 08/14/2023 1  % Final    % Basophils 08/14/2023 1  % Final    % Immature Granulocytes 08/14/2023 0  % Final    Absolute Neutrophils 08/14/2023 5.3  1.6 - 8.3 10e3/uL Final    Absolute Lymphocytes 08/14/2023 2.6  0.8 - 5.3 10e3/uL Final    Absolute Monocytes 08/14/2023 0.9  0.0 - 1.3 10e3/uL Final    Absolute Eosinophils 08/14/2023 0.1  0.0 - 0.7 10e3/uL Final    Absolute Basophils 08/14/2023 0.1  0.0 - 0.2 10e3/uL Final    Absolute Immature Granulocytes 08/14/2023 0.0  <=0.4 10e3/uL Final        Lab Results    Component Value Date    WBC 8.9 08/14/2023    WBC 6.1 12/09/2021    HGB 15.0 08/14/2023    HGB 15.9 12/09/2021     08/14/2023     12/09/2021    ALT 39 08/14/2023    AST 35 08/14/2023     08/14/2023     12/09/2021    BUN 13 08/14/2023    BUN 18 12/09/2021    CO2 28 08/14/2023    CO2 27 12/09/2021    TSH 1.90 12/09/2021        Liver Function Studies -   Recent Labs   Lab Test 08/14/23  1429   PROTTOTAL 7.0   ALBUMIN 4.0   BILITOTAL 0.8   ALKPHOS 81   AST 35   ALT 39        Narrative & Impression   ABDOMEN TWO-THREE VIEW August 14, 2023 2:42 PM      HISTORY: Abdominal pain.     COMPARISON: None.     FINDINGS: Small amount of stool. No free air. There are no air filled  distended loops of small bowel. The colon is not distended. The lung  bases are unremarkable.                                                                      IMPRESSION: Nonobstructed bowel gas pattern.         PREVIOUS ENDOSCOPY    No results found for this or any previous visit.    ASSESSMENT/PLAN:  Raymundo Mcwilliams is a 43 year old year old male with no significant PMHx  who presents to [facility] with the  [facility] for changes in stooling.On 8/2 he might have ate bad shrimp at a restaurant, then shortly thereafter he developed chills, fatigue, 10x watery diarrhea with fecal urgency and gas . Never had blood or black stools. He reached out to a virtual appt and was given zithromax x 3 days which stopped the diarrhea. His GI system however did not return back to baseline which brought him to the  8/11 where he was tested NEG for h.pylori. He then returned 8/14 where he had a normal KUB, neg CBC, CMP, lipase/amylase and ESR. He picked up 3/4 GI meds (protonix, zofran, pepcid) which he has used PRN and seems to have helped w/ diarrhea and overall GI sx (nausea, belch). He reports continued waxing and waning course of his GI sx which brings him to our visit today.     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 Consider hyperthyroidism.   -reviewed labs and XR from UC w/ patient  -check enteric panel (including giardia), c.diff, ova and parasite given continued GI sx  -given description of stooling (yellow with ?oil droplets) check fecal elastase and random fecal fat, though lower on my ddx  -check inflammatory marker fecal calpro and CRP. ESR was normal 8/14   -check TSH  -as he avoids gluten, or is on a very low gluten containing diet, we instituted shared decision making and decided against checking for celiac diease. Presentation is not typical for celiac and serology will likely be low yield given his current diet. In addition he plans to continue low gluten/GF diet.    -c-scope scheduled for 9/20 (normal screening) with Dr Steve. He will need to reschedule this, number given for endoscopy. Once it is rescheduled, if changes in stools continue, will consider sending message to endoscopist for random colon biopsy (though presentation is not typical for microscopic colitis). Will ask he send a [employer] message my way to alert me of new date   -OK to start fiber, per AVS  -encouraged to take bernie chews. Will send Rx zofran for severe nausea.   -rec to avoid dairy for now given ongoing GI changes   -suspect diarrhea from the other day(9/4) may be related to his alcohol use over labor day weekend festivities; encouraged him to decrease use to 2/d. Otherwise he reports daily etoh use is <2/d on a regular use.   Amylase/lipase WNL from 8/14. Presentation is not typical for acute pancreatitis but can consider in ddx      Future consideration  1. If C-scope and work up negative, consider IB Jacob trial  2. GI RD consult for low-fodmap diet with reintroduction     Orders Placed This Encounter   Procedures    Calprotectin Feces      Standing Status:   Future     Standing Expiration Date:   9/5/2024    CRP inflammation     Standing Status:   Future     Standing Expiration Date:   9/5/2024    Elastase Fecal     Standing Status:   Future     Standing Expiration Date:   9/5/2024    Fat Stool Qual Random Collection     Standing Status:   Future     Standing Expiration Date:   10/5/2023    TSH with free T4 reflex     Standing Status:   Future     Standing Expiration Date:   9/5/2024    Enteric Bacteria and Virus Panel by ANITHA Stool     Standing Status:   Future     Standing Expiration Date:   9/5/2024     Order Specific Question:   Has patient been hospitalized for greater than 3 days?     Answer:   No    C. difficile Toxin B PCR with reflex to C. difficile Antigen and Toxins A/B EIA     Standing Status:   Future     Standing Expiration Date:   10/5/2023    Ova and Parasite Exam Routine     Standing Status:   Future     Standing Expiration Date:   9/5/2024     Order Specific Question:   Consider ordering the Enteric Pathogen and Virus Panel PCR ([id number]), unless one of the following apply (select one):     Answer:   Chronic Diarrhea (>2 weeks) with negative PCR ([id number])      Colorectal cancer screening: await c-scope results     RTC  - after endoscopy     Thank you for this consultation.  It was a pleasure to participate in the care of this patient; please contact me with any further questions.     Follow up: As planned above. Today, I personally spent 46 minutes in direct face to face time with the patient, of which greater than 50% of the time was spent in patient education and counseling as described above. Approximately 20 minutes were spent on indirect care associated with the patient's consultation including but not limited to review of: patient medical records to date, clinic visits, hospital records, lab results, imaging studies, procedural documentation, and coordinating care with other providers. The findings from this review are summarized  in the above note. All of the above accounted for a cumulative time of 66 minutes and was performed on the date of service.     Joined the call at 9/5/2023, 2:57:25 pm.  Left the call at 9/5/2023, 3:43:58 pm.  You were on the call for 46 minutes 32 seconds .      Again, thank you for allowing me to participate in the care of your patient.      Sincerely,    Jayla Funes PA-C

## 2023-09-05 NOTE — PROGRESS NOTES
Virtual Visit Details    Type of service:  Video Visit     Originating Location (pt. Location): Home in [place]     Distant Location (provider location):  Off-site  Platform used for Video Visit: Adam     Joined the call at 9/5/2023, 2:57:25 pm.  Left the call at 9/5/2023, 3:43:58 pm.  You were on the call for 46 minutes 32 seconds .

## 2023-09-07 NOTE — TELEPHONE ENCOUNTER
Rescheduled Procedure    Attempted to contact patient in order to complete pre assessment questions.     No answer. Left message to return call to [phone] option 4      Procedure details:    Patient scheduled for Colonoscopy  on 9/20/23.     Arrival time: 1120. Procedure time 1205    Pre op exam needed? N/A    Facility location: [facility]; [address], [place] [address]    Sedation type: Conscious sedation     Indication for procedure: screening     Medication review:    Other medication HOLDING recommendations:  N/A      Prep for procedure:     Bowel prep recommendation: Miralax prep without magnesium citrate   Due to:  standard bowel prep.    Prep instructions sent via [employer]         Carmen Parham RN  Endoscopy Procedure Pre Assessment RN  [phone] option 4

## 2023-09-11 ENCOUNTER — TELEPHONE (OUTPATIENT)
Dept: GASTROENTEROLOGY | Facility: CLINIC | Age: 43
End: 2023-09-11
Payer: COMMERCIAL

## 2023-09-11 NOTE — TELEPHONE ENCOUNTER
Caller: Raymundo Mcwilliams    Reason for Reschedule/Cancellation (please be detailed, any staff messages or encounters to note?): Patient request to reschedule, will be out of town      Prior to reschedule please review:  Ordering Provider: Sybil Cunningham MD  Sedation per order: MODERATE  Does patient have any ASC Exclusions, please identify?: NO      Notes on Cancelled Procedure:  Procedure: Lower Endoscopy [Colonoscopy]   Date: 9/20  Location: [facility]; [address], [place] [address]  Surgeon: DEONNA      Rescheduled: Yes  Procedure: Lower Endoscopy [Colonoscopy]   Date: 11/2  Location: [facility]; [address], [place] [address]  Surgeon: CHANCE  Sedation Level Scheduled  MODERATE,  Reason for Sedation Level PER ORDER  Prep/Instructions updated and sent: [employer]       Send In - basket message to Panc - Nahun Pool if EUS  procedure is canceled or rescheduled: [ N/A, YES or NO] N/A

## 2023-09-13 ENCOUNTER — LAB (OUTPATIENT)
Dept: LAB | Facility: CLINIC | Age: 43
End: 2023-09-13
Payer: COMMERCIAL

## 2023-09-13 DIAGNOSIS — R19.5 CHANGE IN STOOL: ICD-10-CM

## 2023-09-13 PROCEDURE — 36415 COLL VENOUS BLD VENIPUNCTURE: CPT

## 2023-09-13 PROCEDURE — 84443 ASSAY THYROID STIM HORMONE: CPT

## 2023-09-13 PROCEDURE — 86140 C-REACTIVE PROTEIN: CPT

## 2023-09-14 LAB
CRP SERPL-MCNC: <3 MG/L
TSH SERPL DL<=0.005 MIU/L-ACNC: 1.57 UIU/ML (ref 0.3–4.2)

## 2023-09-15 ENCOUNTER — TELEPHONE (OUTPATIENT)
Dept: GASTROENTEROLOGY | Facility: CLINIC | Age: 43
End: 2023-09-15
Payer: COMMERCIAL

## 2023-09-15 NOTE — TELEPHONE ENCOUNTER
Contacted pt to schedule appointment as provider is not available at time. Lvmx2, sent letter

## 2023-09-18 ENCOUNTER — APPOINTMENT (OUTPATIENT)
Dept: LAB | Facility: CLINIC | Age: 43
End: 2023-09-18
Payer: COMMERCIAL

## 2023-09-18 LAB — C DIFF TOX B STL QL: NEGATIVE

## 2023-09-18 PROCEDURE — 82705 FATS/LIPIDS FECES QUAL: CPT | Mod: 90

## 2023-09-18 PROCEDURE — 99000 SPECIMEN HANDLING OFFICE-LAB: CPT

## 2023-09-18 PROCEDURE — 87209 SMEAR COMPLEX STAIN: CPT

## 2023-09-18 PROCEDURE — 83993 ASSAY FOR CALPROTECTIN FECAL: CPT

## 2023-09-18 PROCEDURE — 87507 IADNA-DNA/RNA PROBE TQ 12-25: CPT

## 2023-09-18 PROCEDURE — 87493 C DIFF AMPLIFIED PROBE: CPT

## 2023-09-18 PROCEDURE — 87177 OVA AND PARASITES SMEARS: CPT

## 2023-09-18 PROCEDURE — 82653 EL-1 FECAL QUANTITATIVE: CPT

## 2023-09-19 LAB — O+P STL MICRO: NEGATIVE

## 2023-09-20 ENCOUNTER — MYC MEDICAL ADVICE (OUTPATIENT)
Dept: GASTROENTEROLOGY | Facility: CLINIC | Age: 43
End: 2023-09-20
Payer: COMMERCIAL

## 2023-09-20 DIAGNOSIS — A07.4 INFECTION OF INTESTINE DUE TO CYCLOSPORA CAYETANENSIS: Primary | ICD-10-CM

## 2023-09-20 LAB
ADV 40+41 DNA STL QL NAA+NON-PROBE: NEGATIVE
ASTRO TYP 1-8 RNA STL QL NAA+NON-PROBE: NEGATIVE
C CAYETANENSIS DNA STL QL NAA+NON-PROBE: POSITIVE
CALPROTECTIN STL-MCNT: 22.6 MG/KG (ref 0–49.9)
CAMPYLOBACTER DNA SPEC NAA+PROBE: NEGATIVE
CRYPTOSP DNA STL QL NAA+NON-PROBE: NEGATIVE
E COLI O157 DNA STL QL NAA+NON-PROBE: ABNORMAL
E HISTOLYT DNA STL QL NAA+NON-PROBE: NEGATIVE
EAEC ASTA GENE ISLT QL NAA+PROBE: NEGATIVE
EC STX1+STX2 GENES STL QL NAA+NON-PROBE: NEGATIVE
ELASTASE PANC STL-MCNT: >800 UG/G
EPEC EAE GENE STL QL NAA+NON-PROBE: NEGATIVE
ETEC LTA+ST1A+ST1B TOX ST NAA+NON-PROBE: NEGATIVE
G LAMBLIA DNA STL QL NAA+NON-PROBE: NEGATIVE
NOROVIRUS GI+II RNA STL QL NAA+NON-PROBE: NEGATIVE
P SHIGELLOIDES DNA STL QL NAA+NON-PROBE: NEGATIVE
RVA RNA STL QL NAA+NON-PROBE: NEGATIVE
SALMONELLA SP RPOD STL QL NAA+PROBE: NEGATIVE
SAPO I+II+IV+V RNA STL QL NAA+NON-PROBE: NEGATIVE
SHIGELLA SP+EIEC IPAH ST NAA+NON-PROBE: NEGATIVE
V CHOLERAE DNA SPEC QL NAA+PROBE: NEGATIVE
VIBRIO DNA SPEC NAA+PROBE: NEGATIVE
Y ENTEROCOL DNA STL QL NAA+PROBE: NEGATIVE

## 2023-09-20 RX ORDER — SULFAMETHOXAZOLE/TRIMETHOPRIM 800-160 MG
1 TABLET ORAL 2 TIMES DAILY
Qty: 14 TABLET | Refills: 0 | Status: SHIPPED | OUTPATIENT
Start: 2023-09-20 | End: 2023-09-27

## 2023-09-20 NOTE — TELEPHONE ENCOUNTER
Tried contacting patient to review positive enteric panel for cyclospra cayetanensis.   Given relapsing remitting history with stooling with continued erratic BM, nausea , GI discomfort, would like to give course of bactrim DS BID x 7 days with mychart follow-up (he send me a message) in 7-10 days. Will plan to speak with patient about plan.   Left  for a call back. Will send a [employer] message.   sy    Result Notes          Component Ref Range & Units 2 d ago    Campylobacter species Negative Negative    Salmonella species Negative Negative    Vibrio species Negative Negative    Vibrio cholerae Negative Negative    Yersinia enterocolitica Negative Negative    Enteropathogenic E. coli (EPEC) Negative, NA Negative    Shiga-like toxin-producing E. coli (STEC) Negative Negative    Shigella/Enteroinvasive E. coli (EIEC) Negative Negative    Cryptosporidium species Negative Negative    Giardia lamblia Negative Negative    Norovirus Gl/Gll Negative Negative    Rotavirus A Negative Negative    Plesiomonas shigelloides Negative Negative    Enteroaggregative E. coli (EAEC) Negative Negative    Enterotoxigenic E. coli (ETEC) Negative Negative    E. coli O157 Negative, NA NA    Cyclospora cayetanensis Negative Positive Abnormal     Entamoeba histolytica Negative Negative    Adenovirus F40/41 Negative Negative    Astrovirus Negative Negative    Sapovirus Negative Negative

## 2023-09-20 NOTE — TELEPHONE ENCOUNTER
"Spoke with patient, answered his questions about infection. He wondered about false positive? It's possible, I suppose, though given his clinical symptoms, I believe it could be causing or contributing to his symptoms.   He wondered if this is a common infection I see? He has reviewed CDC website. We discussed that it is typically food-borne transmission; that I've not seen it commonly but is something our team does come across.   He wonders if this course of \"abx will nip his sx in the bud?\" If it's driven by this infection, then yes I would expect sx improvement. I did want him to send a [employer] message my way 7-10 days with treatment for an update. He is agreeable.     He wants to start treatment with bactrim. Feeling good right now, will be back home on Saturday.   Encouraged him to reach out to our clinic if he has any other questions.   Agreeable with plan.     sy  "
PAST SURGICAL HISTORY:  No significant past surgical history

## 2023-09-21 LAB
FAT STL QL: NORMAL
NEUTRAL FAT STL QL: NORMAL

## 2023-10-19 NOTE — TELEPHONE ENCOUNTER
Rescheduled colonoscopy    Attempted to contact patient in order to complete pre assessment questions.     No answer. Left message to return call to [phone] option 4      Procedure details:    Patient scheduled for Colonoscopy  on 11.2.2023.     Arrival time: 0945. Procedure time 1030    Pre op exam needed? N/A    Facility location: [facility]; [address], [place] [address]    Sedation type: Conscious sedation     Indication for procedure: screening, diarrhea      Chart review:     Electronic implanted devices? No    Diabetic? No    Diabetic medication HOLDING recommendations: (if applicable)  Oral diabetic medications: N/A  Diabetic injectables: N/A  Insulin: N/A      Medication review:    Anticoagulants? No    NSAIDS? No NSAID medications per patient's medication list.  RN will verify with pre-assessment call.    Other medication HOLDING recommendations:  N/A      Prep for procedure:     Bowel prep recommendation: Miralax without magnesium citrate  Due to: standard prep due to recall.    Prep instructions sent via [employer].       Shawanda Rogers RN  Endoscopy Procedure Pre Assessment RN

## 2023-10-24 NOTE — TELEPHONE ENCOUNTER
Second call attempt to complete pre assessment.     No answer.  Left message to return call to [phone] #4 by next business day prior to 4PM or procedure will be sent to cancel.     Additional information needed?  N/A      Carmen Wynn RN  Endoscopy Procedure Pre Assessment RN

## 2023-10-26 ENCOUNTER — TELEPHONE (OUTPATIENT)
Dept: GASTROENTEROLOGY | Facility: CLINIC | Age: 43
End: 2023-10-26
Payer: COMMERCIAL

## 2023-10-26 NOTE — TELEPHONE ENCOUNTER
No return call from the patient to complete PA. Staff message sent to cancel the procedure.     Maraynne Perdue RN   Endoscopy Procedure Pre Assessment RN

## 2023-10-26 NOTE — TELEPHONE ENCOUNTER
NEW SCREENING QUESTIONS NEEDED!    Caller: No call made  Reason for Reschedule/Cancellation (please be detailed, any staff messages or encounters to note?): Cancellation policy - pre-assessment call not completed.      Prior to reschedule please review:  Ordering Provider: Sybli Cunningham MD   Sedation per order: MOderate  Does patient have any ASC Exclusions, please identify?: No      Notes on Cancelled Procedure:  Procedure: Lower Endoscopy [Colonoscopy]   Date: 11/2/2023  Location: [facility]; [address] [place] [address] [address], [place] [address]  Surgeon: Natalya      Patient called back and added back to 11/2 schedule, transferred to pre assessment

## 2023-10-26 NOTE — TELEPHONE ENCOUNTER
The Pt did end up calling back, and was placed back on the schedule in his original spot.     Pre assessment completed for upcoming procedure.   (Please see previous telephone encounter notes for complete details)    Patient  returned call.       Procedure details:    Arrival time and facility location reviewed.    Pre op exam needed? N/A    Designated  policy reviewed. Instructed to have someone stay 6 hours post procedure.     COVID policy reviewed.      Medication review:    Medications reviewed. Please see supporting documentation below. Holding recommendations discussed (if applicable).       Prep for procedure:     Procedure prep instructions reviewed.        Additional information needed?  N/A      Patient  verbalized understanding and had no questions or concerns at this time.      Maryanne Perdue RN  Endoscopy Procedure Pre Assessment RN  [phone] option 4

## 2023-11-02 ENCOUNTER — HOSPITAL ENCOUNTER (OUTPATIENT)
Facility: AMBULATORY SURGERY CENTER | Age: 43
Discharge: HOME | End: 2023-11-02
Attending: INTERNAL MEDICINE | Admitting: INTERNAL MEDICINE
Payer: COMMERCIAL

## 2023-11-02 VITALS
RESPIRATION RATE: 16 BRPM | TEMPERATURE: 98.6 F | BODY MASS INDEX: 27.41 KG/M2 | OXYGEN SATURATION: 99 % | DIASTOLIC BLOOD PRESSURE: 93 MMHG | HEART RATE: 70 BPM | WEIGHT: 184 LBS | SYSTOLIC BLOOD PRESSURE: 125 MMHG

## 2023-11-02 LAB — COLONOSCOPY: NORMAL

## 2023-11-02 PROCEDURE — G8918 PT W/O PREOP ORDER IV AB PRO: HCPCS

## 2023-11-02 PROCEDURE — 45380 COLONOSCOPY AND BIOPSY: CPT

## 2023-11-02 PROCEDURE — 88305 TISSUE EXAM BY PATHOLOGIST: CPT | Performed by: STUDENT IN AN ORGANIZED HEALTH CARE EDUCATION/TRAINING PROGRAM

## 2023-11-02 PROCEDURE — G8907 PT DOC NO EVENTS ON DISCHARG: HCPCS

## 2023-11-02 RX ORDER — FENTANYL CITRATE 50 UG/ML
INJECTION, SOLUTION INTRAMUSCULAR; INTRAVENOUS PRN
Status: DISCONTINUED | OUTPATIENT
Start: 2023-11-02 | End: 2023-11-02 | Stop reason: HOSPADM

## 2023-11-02 RX ORDER — ONDANSETRON 4 MG/1
4 TABLET, ORALLY DISINTEGRATING ORAL EVERY 6 HOURS PRN
Status: DISCONTINUED | OUTPATIENT
Start: 2023-11-02 | End: 2023-11-03 | Stop reason: HOSPADM

## 2023-11-02 RX ORDER — NALOXONE HYDROCHLORIDE 0.4 MG/ML
0.4 INJECTION, SOLUTION INTRAMUSCULAR; INTRAVENOUS; SUBCUTANEOUS
Status: DISCONTINUED | OUTPATIENT
Start: 2023-11-02 | End: 2023-11-03 | Stop reason: HOSPADM

## 2023-11-02 RX ORDER — LIDOCAINE 40 MG/G
CREAM TOPICAL
Status: DISCONTINUED | OUTPATIENT
Start: 2023-11-02 | End: 2023-11-03 | Stop reason: HOSPADM

## 2023-11-02 RX ORDER — PROCHLORPERAZINE MALEATE 10 MG
10 TABLET ORAL EVERY 6 HOURS PRN
Status: DISCONTINUED | OUTPATIENT
Start: 2023-11-02 | End: 2023-11-03 | Stop reason: HOSPADM

## 2023-11-02 RX ORDER — NALOXONE HYDROCHLORIDE 0.4 MG/ML
0.2 INJECTION, SOLUTION INTRAMUSCULAR; INTRAVENOUS; SUBCUTANEOUS
Status: DISCONTINUED | OUTPATIENT
Start: 2023-11-02 | End: 2023-11-03 | Stop reason: HOSPADM

## 2023-11-02 RX ORDER — FLUMAZENIL 0.1 MG/ML
0.2 INJECTION, SOLUTION INTRAVENOUS
Status: ACTIVE | OUTPATIENT
Start: 2023-11-02 | End: 2023-11-02

## 2023-11-02 RX ORDER — ONDANSETRON 2 MG/ML
4 INJECTION INTRAMUSCULAR; INTRAVENOUS
Status: DISCONTINUED | OUTPATIENT
Start: 2023-11-02 | End: 2023-11-03 | Stop reason: HOSPADM

## 2023-11-02 RX ORDER — ONDANSETRON 2 MG/ML
4 INJECTION INTRAMUSCULAR; INTRAVENOUS EVERY 6 HOURS PRN
Status: DISCONTINUED | OUTPATIENT
Start: 2023-11-02 | End: 2023-11-03 | Stop reason: HOSPADM

## 2023-11-02 NOTE — PROGRESS NOTES
25mg iv benadryl given at 1005. 25mg iv benadryl given at 1009. Functionality not working in intra-op meds.

## 2023-11-02 NOTE — H&P
[facility] Anesthesia Pre-op History and Physical    Raymundo Mcwilliams MRN# [id number]   Age: 43 year old YOB: 1980     Date of Exam 11/2/2023         Primary care provider: Sybil Cunningham         Chief Complaint and/or Reason for Procedure:     CRC screening - first colonoscopy       Active problem list:     Patient Active Problem List    Diagnosis Date Noted    Constipation 04/15/2013     Priority: Medium    Elevated blood pressure reading without diagnosis of hypertension 11/10/2008     Priority: Medium    Heart murmur 11/10/2008     Priority: Medium     Formatting of this note might be different from the original.  Sounds innocent; will request old records.  Elia Jordan M.D. 11/10/2008 8:16 AM              Medications (include herbals and vitamins):   Any Plavix use in the last 7 days? No     Current Outpatient Medications   Medication Sig    famotidine (PEPCID) 40 MG tablet Take 1 tablet (40 mg) by mouth daily    ondansetron (ZOFRAN ODT) 4 MG ODT tab Take 1 tablet (4 mg) by mouth every 8 hours as needed for nausea    pantoprazole (PROTONIX) 40 MG EC tablet Take 1 tablet (40 mg) by mouth daily    sucralfate (CARAFATE) 1 GM/10ML suspension Take 10 mLs (1 g) by mouth 4 times daily    tadalafil (CIALIS) 5 MG tablet TAKE ONE TABLET BY MOUTH ONCE DAILY AS NEEDED    ondansetron (ZOFRAN) 4 MG tablet Take 1 tablet (4 mg) by mouth every 8 hours as needed for nausea    order for DME Equipment being ordered: Digital home blood pressure monitor kit     Current Facility-Administered Medications   Medication    lidocaine (LMX4) kit    lidocaine 1 % 0.1-1 mL    ondansetron (ZOFRAN) injection 4 mg    sodium chloride (PF) 0.9% PF flush 3 mL    sodium chloride (PF) 0.9% PF flush 3 mL             Allergies:      Allergies   Allergen Reactions    No Known Allergies      Allergy to Latex? No  Allergy to tape?   No  Intolerances:             Physical Exam:   All vitals have been reviewed  Patient Vitals  for the past 8 hrs:   BP Temp Temp src Pulse Resp SpO2 Weight   11/02/23 0914 (!) 140/113 98.6  F (37  C) Temporal 70 16 100 % 83.5 kg (184 lb)     No intake/output data recorded.  Lungs:   No increased work of breathing, good air exchange, clear to auscultation bilaterally, no crackles or wheezing     Cardiovascular:   normal S1 and S2             Lab / Radiology Results:            Anesthetic risk and/or ASA classification:     1  Heydi Hoang DO

## 2023-11-06 LAB
PATH REPORT.COMMENTS IMP SPEC: NORMAL
PATH REPORT.COMMENTS IMP SPEC: NORMAL
PATH REPORT.FINAL DX SPEC: NORMAL
PATH REPORT.GROSS SPEC: NORMAL
PATH REPORT.MICROSCOPIC SPEC OTHER STN: NORMAL
PATH REPORT.RELEVANT HX SPEC: NORMAL
PHOTO IMAGE: NORMAL

## 2023-11-13 DIAGNOSIS — H40.002 GLAUCOMA SUSPECT OF LEFT EYE: Primary | ICD-10-CM

## 2023-11-13 NOTE — PROGRESS NOTES
Chief Complaint/Presenting Concern: Glaucoma evaluation     History of Present Illness:   Raymundo Mcwilliams is a 43 year old patient who presents for evaluation of glaucoma.   Previously lived in [place], evaluated as glaucoma suspect at the age of 18. Recently having difficulty seeing for near. Has been evaluated by [facility]  10 years ago, and then continued to see [facility] in [place], then referred again to glaucoma now.     Relevant Past Medical/Family/Social History: HTN, murmur     Relevant Review of Systems: None      Diagnosis: Glaucoma suspect due to abnormal disc or VF findings with low IOP  Year diagnosis: at age of 18   Previous glaucoma surgery/laser: None    Maximum intraocular pressure: unknown   Currently Meds: None   Family history: negative  CCT (um): 11/14/2023: 565/564   Gonio: 11/14/2023: open to Scleral spur each eye   Refractive status: Axial myopia  Trauma history: negative  Steroid exposure: negative  Vasospastic disease: Migrane/Raynaud phenomenon: negative  A past hemodynamic crisis or Low BP:: negative  Meds AEs/intolerance: None   PMHx: none history of Asthma and respiratory problems/Cardiac/Renal/Kidney stones/Sulfa Allergy  Anticoagulants     Today's testing:  IOP 14/14 mmHg   Visual field November 14, 2023:  Right eye - normal, reliable, baseline at [facility]   Left eye - nasal defect, reliable, baseline at [facility]  OCT Optic Nerve RNFL Spectralis November 14, 2023  right eye: SN RNFL thinning   left eye: SN RNFL thinning     Additional Ocular History:     Myopia each eye     Plan/Recommendations:  Discussed findings with patient.  Right eye suspect based on OCT RNFL findings, no VF changes, will continue to observe   Left eye suspect based on OCT RNFL and VF findings, given IOP WNL and minimal VF involvement will continue to monitor. Asked patient o send over all prior VF testings to compare. If any progression then may consider treatment.     RTC 1 year VF, OCT RNFL     Physician Attestation      Attending Physician Attestation:  Complete documentation of historical and exam elements from today's encounter can be found in the full encounter summary report (not reduplicated in this progress note). I personally obtained the chief complaint(s) and history of present illness. I confirmed and edited as necessary the review of systems, past medical/surgical history, family history, social history, and examination findings as documented by others; and I examined the patient myself. I personally reviewed the relevant tests, images, and reports as documented above. I personally reviewed the ophthalmic test(s) associated with this encounter. I formulated and edited as necessary the assessment and plan and discussed the findings and management plan with the patient and any family members present at the time of the visit.  Dariana Guadalupe M.D., Glaucoma, November 13, 2023

## 2023-11-14 ENCOUNTER — OFFICE VISIT (OUTPATIENT)
Dept: OPHTHALMOLOGY | Facility: CLINIC | Age: 43
End: 2023-11-14
Attending: INTERNAL MEDICINE
Payer: COMMERCIAL

## 2023-11-14 DIAGNOSIS — H40.002 GLAUCOMA SUSPECT OF LEFT EYE: ICD-10-CM

## 2023-11-14 PROCEDURE — 92083 EXTENDED VISUAL FIELD XM: CPT | Performed by: OPHTHALMOLOGY

## 2023-11-14 PROCEDURE — 92020 GONIOSCOPY: CPT | Performed by: OPHTHALMOLOGY

## 2023-11-14 PROCEDURE — 99213 OFFICE O/P EST LOW 20 MIN: CPT | Performed by: OPHTHALMOLOGY

## 2023-11-14 PROCEDURE — 76514 ECHO EXAM OF EYE THICKNESS: CPT | Performed by: OPHTHALMOLOGY

## 2023-11-14 PROCEDURE — 92002 INTRM OPH EXAM NEW PATIENT: CPT | Performed by: OPHTHALMOLOGY

## 2023-11-14 PROCEDURE — 92133 CPTRZD OPH DX IMG PST SGM ON: CPT | Performed by: OPHTHALMOLOGY

## 2023-11-14 ASSESSMENT — TONOMETRY
OD_IOP_MMHG: 14
IOP_METHOD: APPLANATION
OD_IOP_MMHG: 15
OS_IOP_MMHG: 15
IOP_METHOD: TONOPEN
OS_IOP_MMHG: 14

## 2023-11-14 ASSESSMENT — EXTERNAL EXAM - RIGHT EYE: OD_EXAM: NORMAL

## 2023-11-14 ASSESSMENT — CONF VISUAL FIELD
OS_INFERIOR_TEMPORAL_RESTRICTION: 0
OD_SUPERIOR_NASAL_RESTRICTION: 0
OD_NORMAL: 1
METHOD: COUNTING FINGERS
OS_SUPERIOR_TEMPORAL_RESTRICTION: 0
OD_INFERIOR_NASAL_RESTRICTION: 0
OD_SUPERIOR_TEMPORAL_RESTRICTION: 0
OS_NORMAL: 1
OS_INFERIOR_NASAL_RESTRICTION: 0
OS_SUPERIOR_NASAL_RESTRICTION: 0
OD_INFERIOR_TEMPORAL_RESTRICTION: 0

## 2023-11-14 ASSESSMENT — REFRACTION_MANIFEST
OD_CYLINDER: +0.50
OD_ADD: +1.50
OS_SPHERE: -3.50
OS_CYLINDER: +0.25
OD_SPHERE: -3.50
OS_AXIS: 020
OS_ADD: +1.50
OD_AXIS: 180

## 2023-11-14 ASSESSMENT — PACHYMETRY
OS_CT(UM): 564
OD_CT(UM): 565

## 2023-11-14 ASSESSMENT — SLIT LAMP EXAM - LIDS
COMMENTS: NORMAL
COMMENTS: NORMAL

## 2023-11-14 ASSESSMENT — VISUAL ACUITY
OS_CC: 20/20
OD_CC: 20/25
CORRECTION_TYPE: CONTACTS
OS_CC+: -1
METHOD: SNELLEN - LINEAR

## 2023-11-14 ASSESSMENT — CUP TO DISC RATIO
OD_RATIO: 0.6
OS_RATIO: 0.4

## 2023-11-14 ASSESSMENT — EXTERNAL EXAM - LEFT EYE: OS_EXAM: NORMAL

## 2023-11-14 NOTE — NURSING NOTE
Chief Complaints and History of Present Illnesses   Patient presents with    New Patient     Chief Complaint(s) and History of Present Illness(es)       New Patient              Laterality: both eyes    Associated symptoms: Negative for flashes and floaters    Treatments tried: no treatments    Pain scale: 0/10              Comments    New patient for glaucoma evaluation.    The patient notes he has been told of having Glaucoma since age 18.  He is wearing contact lenses currently.  He is having trouble with near vision.  He reports a increased difficulty with night vision.  Liz Villalobos, COA, COA 1:24 PM 11/14/2023

## 2023-11-18 ENCOUNTER — TRANSFERRED RECORDS (OUTPATIENT)
Dept: HEALTH INFORMATION MANAGEMENT | Facility: CLINIC | Age: 43
End: 2023-11-18
Payer: COMMERCIAL

## 2024-01-24 ENCOUNTER — PATIENT OUTREACH (OUTPATIENT)
Dept: CARE COORDINATION | Facility: CLINIC | Age: 44
End: 2024-01-24
Payer: COMMERCIAL

## 2024-02-07 ENCOUNTER — PATIENT OUTREACH (OUTPATIENT)
Dept: CARE COORDINATION | Facility: CLINIC | Age: 44
End: 2024-02-07
Payer: COMMERCIAL

## 2024-04-02 DIAGNOSIS — N52.9 ERECTILE DYSFUNCTION, UNSPECIFIED ERECTILE DYSFUNCTION TYPE: ICD-10-CM

## 2024-04-02 RX ORDER — TADALAFIL 5 MG/1
TABLET ORAL
Qty: 30 TABLET | Refills: 0 | Status: SHIPPED | OUTPATIENT
Start: 2024-04-02 | End: 2024-05-13

## 2024-04-04 DIAGNOSIS — N52.9 ERECTILE DYSFUNCTION, UNSPECIFIED ERECTILE DYSFUNCTION TYPE: ICD-10-CM

## 2024-04-04 RX ORDER — TADALAFIL 5 MG/1
TABLET ORAL
Qty: 30 TABLET | Refills: 0 | OUTPATIENT
Start: 2024-04-04

## 2024-05-05 ENCOUNTER — HEALTH MAINTENANCE LETTER (OUTPATIENT)
Age: 44
End: 2024-05-05

## 2024-05-13 ENCOUNTER — VIRTUAL VISIT (OUTPATIENT)
Dept: FAMILY MEDICINE | Facility: CLINIC | Age: 44
End: 2024-05-13
Payer: COMMERCIAL

## 2024-05-13 DIAGNOSIS — N52.9 ERECTILE DYSFUNCTION, UNSPECIFIED ERECTILE DYSFUNCTION TYPE: ICD-10-CM

## 2024-05-13 DIAGNOSIS — Z00.00 ROUTINE HISTORY AND PHYSICAL EXAMINATION OF ADULT: Primary | ICD-10-CM

## 2024-05-13 PROCEDURE — 99396 PREV VISIT EST AGE 40-64: CPT | Mod: 95 | Performed by: INTERNAL MEDICINE

## 2024-05-13 RX ORDER — TADALAFIL 5 MG/1
5 TABLET ORAL DAILY PRN
Qty: 90 TABLET | Refills: 3 | Status: SHIPPED | OUTPATIENT
Start: 2024-05-13

## 2024-05-13 NOTE — PROGRESS NOTES
Raymundo is a 44 year old who is being evaluated via a billable video visit.      How would you like to obtain your AVS? MyChart  If the video visit is dropped, the invitation should be resent by: Text to cell phone: [phone]  Will anyone else be joining your video visit? No      SUBJECTIVE:   CC: Raymundo is an 44 year old who presents for preventative health visit.   Patient has been advised of split billing requirements and indicates understanding: Yes  History of Present Illness       Reason for visit:  Refill on prescription    He eats 0-1 servings of fruits and vegetables daily.He consumes 0 sweetened beverage(s) daily.He exercises with enough effort to increase his heart rate 30 to 60 minutes per day.  He exercises with enough effort to increase his heart rate 5 days per week.   He is taking medications regularly.    Ability to successfully perform activities of daily living: Yes, no assistance needed  Home safety:  none identified   Hearing impairment:   none    Today's PHQ-2 Score:       5/13/2024     8:28 AM   PHQ-2 ( 1999 [employer])   Q1: Little interest or pleasure in doing things 0   Q2: Feeling down, depressed or hopeless 0   PHQ-2 Score 0   Q1: Little interest or pleasure in doing things Not at all   Q2: Feeling down, depressed or hopeless Not at all   PHQ-2 Score 0           Social History     Tobacco Use    Smoking status: Never    Smokeless tobacco: Never   Substance Use Topics    Alcohol use: Yes     Comment: 10 drinks a week     If you drink alcohol do you typically have >3 drinks per day or >7 drinks per week? No         No data to display              Last PSA:   Prostate Specific Antigen Screen   Date Value Ref Range Status   12/09/2021 1.22 0.00 - 4.00 ug/L Final       Reviewed orders with patient. Reviewed health maintenance and updated orders accordingly - Yes  Labs reviewed in EPIC    Reviewed and updated as needed this visit by clinical staff   Tobacco  Allergies  Meds      Soc Hx         Reviewed and updated as needed this visit by Provider                  Past Medical History:   Diagnosis Date    ED (erectile dysfunction)     HTN (hypertension)         Review of Systems  CONSTITUTIONAL: NEGATIVE for fever, chills, change in weight  INTEGUMENTARY/SKIN: NEGATIVE for worrisome rashes, moles or lesions  EYES: POSITIVE for glaucoma  ENT: POSITIVE for chronic nasal congestion symptoms  RESP: NEGATIVE for significant cough or SOB  CV: NEGATIVE for chest pain, palpitations or peripheral edema  GI: NEGATIVE for nausea, abdominal pain, heartburn, or change in bowel habits   male: Positive for erectile dysfunction  MUSCULOSKELETAL: NEGATIVE for significant arthralgias or myalgia  NEURO: NEGATIVE for weakness, dizziness or paresthesias  PSYCHIATRIC: NEGATIVE for changes in mood or affect    OBJECTIVE:   There were no vitals taken for this visit.    Physical Exam  GENERAL: alert and no distress  EYES: Eyes grossly normal to inspection and conjunctivae and sclerae normal  HENT: nasal congestion symptoms present  NECK: no asymmetry  RESP: lno cough present  CV: patient appears to be hemodynamically stable  SKIN: no visible suspicious lesions or rashes  NEURO: mentation intact and speech normal  PSYCH: affect normal/bright    Diagnostic Test Results:  Labs reviewed in Epic    ASSESSMENT/PLAN:   Raymundo was seen today for recheck medication and refill request.    Diagnoses and all orders for this visit:    Routine history and physical examination of adult  -     REVIEW OF HEALTH MAINTENANCE PROTOCOL ORDERS    Erectile dysfunction, unspecified erectile dysfunction type  -   stable  - medication refilled today for  tadalafil (CIALIS) 5 MG tablet; Take 1 tablet (5 mg) by mouth daily as needed (ED)        Patient has been advised of split billing requirements and indicates understanding: Yes      COUNSELING:   Reviewed preventive health counseling, as reflected in patient instructions  Special attention given to:         Regular exercise       Healthy diet/nutrition        He reports that he has never smoked. He has never used smokeless tobacco.        Sybil Cunningham MD  [facility]        Video-Visit Details    Type of service:  Video Visit     Originating Location (pt. Location): Home    Distant Location (provider location):  Off-site  Platform used for Video Visit: Zac

## 2024-10-07 ENCOUNTER — E-VISIT (OUTPATIENT)
Dept: FAMILY MEDICINE | Facility: CLINIC | Age: 44
End: 2024-10-07
Payer: COMMERCIAL

## 2024-10-07 DIAGNOSIS — R21 RASH: Primary | ICD-10-CM

## 2024-10-07 DIAGNOSIS — L30.9 DERMATITIS: ICD-10-CM

## 2024-10-07 DIAGNOSIS — A49.01 STAPH AUREUS INFECTION: ICD-10-CM

## 2024-10-07 PROCEDURE — 99422 OL DIG E/M SVC 11-20 MIN: CPT | Performed by: INTERNAL MEDICINE

## 2024-10-07 RX ORDER — MUPIROCIN 20 MG/G
OINTMENT TOPICAL 3 TIMES DAILY
Qty: 15 G | Refills: 2 | Status: SHIPPED | OUTPATIENT
Start: 2024-10-07

## 2024-10-07 RX ORDER — TRIAMCINOLONE ACETONIDE 1 MG/G
CREAM TOPICAL 2 TIMES DAILY
Qty: 30 G | Refills: 2 | Status: SHIPPED | OUTPATIENT
Start: 2024-10-07

## 2024-10-07 NOTE — TELEPHONE ENCOUNTER
Provider E-Visit time total (minutes): 12 minutes      Chief Complaint:     E-visit for rash symptoms    HPI:   Patient Raymundo Mcwilliams is a very pleasant 44 year old male who presents to Internal Medicine clinic today for E-visit for skin rash symptoms.       Current Medications:     Current Outpatient Medications   Medication Sig Dispense Refill    famotidine (PEPCID) 40 MG tablet Take 1 tablet (40 mg) by mouth daily 30 tablet 0    ondansetron (ZOFRAN) 4 MG tablet Take 1 tablet (4 mg) by mouth every 8 hours as needed for nausea 20 tablet 0    order for DME Equipment being ordered: Digital home blood pressure monitor kit 1 each 3    pantoprazole (PROTONIX) 40 MG EC tablet Take 1 tablet (40 mg) by mouth daily 30 tablet 0    sucralfate (CARAFATE) 1 GM/10ML suspension Take 10 mLs (1 g) by mouth 4 times daily 414 mL 0    tadalafil (CIALIS) 5 MG tablet Take 1 tablet (5 mg) by mouth daily as needed (ED) 90 tablet 3         Allergies:      Allergies   Allergen Reactions    No Known Allergies             Past Medical History:     Past Medical History:   Diagnosis Date    ED (erectile dysfunction)     HTN (hypertension)          Past Surgical History:     Past Surgical History:   Procedure Laterality Date    COLONOSCOPY N/A 11/2/2023    Procedure: COLONOSCOPY, WITH POLYPECTOMY AND BIOPSY;  Surgeon: Heydi Hoang DO;  Location: [facility] OR    COLONOSCOPY WITH CO2 INSUFFLATION N/A 11/2/2023    Procedure: Colonoscopy with CO2 insufflation;  Surgeon: Heydi Hoang DO;  Location: [facility] OR         Family Medical History:     Family History   Problem Relation Age of Onset    Diverticulitis Mother     Diverticulitis Father     Skin Cancer Father     Skin Cancer Paternal Uncle     Melanoma No family hx of          Social History:     Social History     Socioeconomic History    Marital status:      Spouse name: Not on file    Number of children: Not on file    Years of education: Not on file    Highest education level: Not on  file   Occupational History    Not on file   Tobacco Use    Smoking status: Never    Smokeless tobacco: Never   Vaping Use    Vaping status: Never Used   Substance and Sexual Activity    Alcohol use: Yes     Comment: 10 drinks a week    Drug use: Not Currently    Sexual activity: Yes     Partners: Female   Other Topics Concern    Parent/sibling w/ CABG, MI or angioplasty before 65F 55M? Not Asked   Social History Narrative    Not on file     Social Determinants of Health     Financial Resource Strain: Not on file   Food Insecurity: Not on file   Transportation Needs: Not on file   Physical Activity: Not on file   Stress: Not on file   Social Connections: Not on file   Interpersonal Safety: Not on file   Housing Stability: Not on file           Review of System:     Constitutional: Negative for fever or chills  Skin: positive for rashes      Physical Exam:   There were no vitals taken for this visit.          Diagnostic Test Results:     Diagnostic Test Results:  Labs reviewed in Epic    ASSESSMENT/PLAN:       Raymundo was seen today for derm problem.    Diagnoses and all orders for this visit:    Rash  -     triamcinolone (KENALOG) 0.1 % external cream; Apply topically 2 times daily.  -     Adult Dermatology  Referral; Future    Dermatitis  -     triamcinolone (KENALOG) 0.1 % external cream; Apply topically 2 times daily.  -     Adult Dermatology  Referral; Future    Staph aureus infection  -     mupirocin (BACTROBAN) 2 % external ointment; Apply topically 3 times daily.            Follow Up Plan:     Patient is instructed to return to Internal Medicine clinic for follow-up visit in 1 week.        Sybil Cunningham MD  Internal Medicine  [facility]

## 2025-04-14 ENCOUNTER — PATIENT OUTREACH (OUTPATIENT)
Dept: CARE COORDINATION | Facility: CLINIC | Age: 45
End: 2025-04-14
Payer: COMMERCIAL

## 2025-04-28 ENCOUNTER — PATIENT OUTREACH (OUTPATIENT)
Dept: CARE COORDINATION | Facility: CLINIC | Age: 45
End: 2025-04-28
Payer: COMMERCIAL

## 2025-06-28 ENCOUNTER — HEALTH MAINTENANCE LETTER (OUTPATIENT)
Age: 45
End: 2025-06-28

## (undated) DEVICE — PAD CHUX UNDERPAD 23X24" 7136

## (undated) DEVICE — KIT ENDO FIRST STEP DISINFECTANT 200ML W/POUCH EP-4

## (undated) DEVICE — PREP CHLORAPREP 26ML TINTED ORANGE  260815

## (undated) RX ORDER — FENTANYL CITRATE 50 UG/ML
INJECTION, SOLUTION INTRAMUSCULAR; INTRAVENOUS
Status: DISPENSED
Start: 2023-11-02

## (undated) RX ORDER — DIPHENHYDRAMINE HYDROCHLORIDE 50 MG/ML
INJECTION INTRAMUSCULAR; INTRAVENOUS
Status: DISPENSED
Start: 2023-11-02